# Patient Record
Sex: FEMALE | Race: BLACK OR AFRICAN AMERICAN | Employment: FULL TIME | URBAN - METROPOLITAN AREA
[De-identification: names, ages, dates, MRNs, and addresses within clinical notes are randomized per-mention and may not be internally consistent; named-entity substitution may affect disease eponyms.]

---

## 2023-11-22 ENCOUNTER — TELEPHONE (OUTPATIENT)
Dept: OBSTETRICS AND GYNECOLOGY | Facility: CLINIC | Age: 33
End: 2023-11-22
Payer: COMMERCIAL

## 2023-11-22 DIAGNOSIS — N76.0 BACTERIAL VAGINOSIS: ICD-10-CM

## 2023-11-22 DIAGNOSIS — B96.89 BACTERIAL VAGINOSIS: ICD-10-CM

## 2023-11-22 DIAGNOSIS — B37.31 YEAST VAGINITIS: Primary | ICD-10-CM

## 2023-11-22 RX ORDER — FLUCONAZOLE 150 MG/1
150 TABLET ORAL ONCE
Qty: 1 TABLET | Refills: 0 | Status: SHIPPED | OUTPATIENT
Start: 2023-11-22 | End: 2023-11-22

## 2023-11-22 RX ORDER — METRONIDAZOLE 7.5 MG/G
GEL VAGINAL DAILY
Qty: 70 G | Refills: 0 | Status: SHIPPED | OUTPATIENT
Start: 2023-11-22 | End: 2023-11-27

## 2023-12-07 ENCOUNTER — OFFICE VISIT (OUTPATIENT)
Dept: OBSTETRICS AND GYNECOLOGY | Facility: CLINIC | Age: 33
End: 2023-12-07
Payer: COMMERCIAL

## 2023-12-07 VITALS
SYSTOLIC BLOOD PRESSURE: 140 MMHG | WEIGHT: 188 LBS | DIASTOLIC BLOOD PRESSURE: 76 MMHG | BODY MASS INDEX: 31.32 KG/M2 | HEIGHT: 65 IN

## 2023-12-07 DIAGNOSIS — N92.1 MENORRHAGIA WITH IRREGULAR CYCLE: ICD-10-CM

## 2023-12-07 DIAGNOSIS — Z01.419 WELL WOMAN EXAM WITH ROUTINE GYNECOLOGICAL EXAM: Primary | ICD-10-CM

## 2023-12-07 LAB
ERYTHROCYTE [DISTWIDTH] IN BLOOD BY AUTOMATED COUNT: 16.8 % (ref 11.5–14.5)
HCT VFR BLD AUTO: 30.9 % (ref 36–46)
HGB BLD-MCNC: 9.2 G/DL (ref 12–16)
MCH RBC QN AUTO: 22.9 PG (ref 26–34)
MCHC RBC AUTO-ENTMCNC: 29.8 G/DL (ref 32–36)
MCV RBC AUTO: 77 FL (ref 80–100)
NRBC BLD-RTO: 0 /100 WBCS (ref 0–0)
PLATELET # BLD AUTO: 404 X10*3/UL (ref 150–450)
RBC # BLD AUTO: 4.01 X10*6/UL (ref 4–5.2)
WBC # BLD AUTO: 5.7 X10*3/UL (ref 4.4–11.3)

## 2023-12-07 PROCEDURE — 36415 COLL VENOUS BLD VENIPUNCTURE: CPT

## 2023-12-07 PROCEDURE — 84443 ASSAY THYROID STIM HORMONE: CPT

## 2023-12-07 PROCEDURE — 1036F TOBACCO NON-USER: CPT | Performed by: OBSTETRICS & GYNECOLOGY

## 2023-12-07 PROCEDURE — 87624 HPV HI-RISK TYP POOLED RSLT: CPT

## 2023-12-07 PROCEDURE — 99395 PREV VISIT EST AGE 18-39: CPT | Performed by: OBSTETRICS & GYNECOLOGY

## 2023-12-07 PROCEDURE — 88175 CYTOPATH C/V AUTO FLUID REDO: CPT

## 2023-12-07 PROCEDURE — 85027 COMPLETE CBC AUTOMATED: CPT

## 2023-12-07 NOTE — PROGRESS NOTES
"Navya Echeverria is a 33 y.o. female who is here for a routine exam.     Periods are  irregular.  Over the last few months since August her cycles are a little more sporadic.  Flow will last anywhere from 11 to 7 days , l    Sexually active: Status post bilateral salpingectomy  Active no concerns    Regular self breast exam: yes  No concerns on her exams    Menstrual History:  OB History          4    Para   4    Term                AB        Living   4         SAB        IAB        Ectopic        Multiple        Live Births                    Patient's last menstrual period was 2023.         Review of Systems  All Normal Review of Systems  Constitutional: no fever, no chills, no recent weight gain, no recent weight loss and no fatigue.    Gastrointestinal: no abdominal pain, no constipation, no nausea, no diarrhea and no vomiting.    Genitourinary: no dysuria, no urinary incontinence, no vaginal dryness, no vaginal itching, no dyspareunia, no pelvic pain, no dysmenorrhea, no sexual problems, no change in urinary frequency, no vaginal discharge, no unexplained vaginal bleeding and no lesion/sore.    Breasts: No masses.  No nipple discharge.  No redness    Objective   /76   Ht 1.651 m (5' 5\")   Wt 85.3 kg (188 lb)   LMP 2023 Comment: Went for 11 days  BMI 31.28 kg/m²     OBGyn Exam   Physical Exam  Constitutional: Alert and in no acute distress. Well developed, well nourished.   Head and Face: Head and face: Normal.    Eyes: Normal external exam - nonicteric sclera, extraocular movements intact (EOMI) and no ptosis.   Ears, Nose, Mouth, and Throat: External inspection of ears and nose: Normal.    Neck: No neck asymmetry. Supple. Thyroid not enlarged and there were no palpable thyroid nodules.   Chest: Breasts: Normal appearance, no nipple discharge and no skin changes. Palpation of breasts and axillae: No palpable mass and no axillary lymphadenopathy.   Abdomen: Soft nontender; no " abdominal mass palpated. No organomegaly. No hernias.     Genitourinary:   External genitalia: Normal. No inguinal lymphadenopathy. Bartholin's Urethral and Skenes Glands: Normal. Urethra: Normal.    Bladder: Normal on palpation.   Vagina: Normal. No discharge  Cervix: Normal.    Uterus: Normal.    Right Adnexa/parametria: Normal.  Left Adnexa/parametria: Normal.    Inspection of Perianal Area: Normal.     Musculoskeletal: No joint swelling seen, normal movements of all extremities.   Skin: Normal skin color and pigmentation, normal skin turgor, and no rash.   Neurologic: Non-focal. Grossly intact.   Psychiatric: Alert and oriented x 3. Affect normal to patient baseline. Mood: Appropriate.      Assessment/Plan   1) Annual exam:  Pap with HPV testing completed today    2) menorrhagia  CBC and TSH today  Ultrasound ordered  Will follow-up thereafter    Thank you again for your visit to our office today  Please follow-up as needed or in 1 year with your annual exam

## 2023-12-08 LAB — TSH SERPL-ACNC: 1.21 MIU/L (ref 0.44–3.98)

## 2024-01-04 ENCOUNTER — APPOINTMENT (OUTPATIENT)
Dept: RADIOLOGY | Facility: HOSPITAL | Age: 34
End: 2024-01-04
Payer: COMMERCIAL

## 2024-01-19 ENCOUNTER — HOSPITAL ENCOUNTER (OUTPATIENT)
Dept: RADIOLOGY | Facility: HOSPITAL | Age: 34
Discharge: HOME | End: 2024-01-19
Payer: COMMERCIAL

## 2024-01-19 DIAGNOSIS — N92.1 MENORRHAGIA WITH IRREGULAR CYCLE: ICD-10-CM

## 2024-01-19 PROCEDURE — 76830 TRANSVAGINAL US NON-OB: CPT | Performed by: RADIOLOGY

## 2024-01-19 PROCEDURE — 76856 US EXAM PELVIC COMPLETE: CPT

## 2024-01-19 PROCEDURE — 76856 US EXAM PELVIC COMPLETE: CPT | Performed by: RADIOLOGY

## 2024-02-01 ENCOUNTER — OFFICE VISIT (OUTPATIENT)
Dept: OBSTETRICS AND GYNECOLOGY | Facility: CLINIC | Age: 34
End: 2024-02-01
Payer: COMMERCIAL

## 2024-02-01 VITALS
BODY MASS INDEX: 29.32 KG/M2 | SYSTOLIC BLOOD PRESSURE: 132 MMHG | WEIGHT: 176 LBS | HEIGHT: 65 IN | DIASTOLIC BLOOD PRESSURE: 78 MMHG

## 2024-02-01 DIAGNOSIS — N92.1 MENORRHAGIA WITH IRREGULAR CYCLE: Primary | ICD-10-CM

## 2024-02-01 PROCEDURE — 99213 OFFICE O/P EST LOW 20 MIN: CPT | Performed by: OBSTETRICS & GYNECOLOGY

## 2024-02-01 PROCEDURE — 1036F TOBACCO NON-USER: CPT | Performed by: OBSTETRICS & GYNECOLOGY

## 2024-02-01 NOTE — PROGRESS NOTES
"Subjective   Patient ID: Navya Echeverria is a 33 y.o. female who presents for Follow-up (Ultrasound Results).    Patient here for follow-up for her menorrhagia  Pelvic ultrasound within normal limits  Status post bilateral salpingectomy and not interested in having more children.  Is a G4, P4.    Patient is not interested in medical options.  Is considering an ablation or hysterectomy.  Discussed with patient we will need to evaluate the lining of the uterus.  Offered office versus D&C.  She will we will schedule as an office hysteroscopy with biopsy    Is considering an ablation versus a D&C.  Both reviewed in detail    ROS  All Normal Review of Systems  Constitutional: no fever, no chills, no recent weight gain, no recent weight loss and no fatigue.    Gastrointestinal: no abdominal pain, no constipation, no nausea, no diarrhea and no vomiting.    Genitourinary: no dysuria, no urinary incontinence, no vaginal dryness, no vaginal itching, no dyspareunia, no pelvic pain, no dysmenorrhea, no sexual problems, no change in urinary frequency, no vaginal discharge, no unexplained vaginal bleeding and no lesion/sore.    Breasts: No masses.  No nipple discharge.  No redness    Objective   /78   Ht 1.651 m (5' 5\")   Wt 79.8 kg (176 lb)   LMP 01/30/2024 Comment: Spotting  BMI 29.29 kg/m²    Physical Exam  General: No distress.  Alert and oriented  Abdomen: Soft, nontender, nondistended  External Genitalia:  no masses.  no erythema.  no discharge noted.  Vagina:  no masses.  no discharge noted.    Cervix: no masses.    Uterus:  normal size and contour.  no masses. palpated  Adnexa: R: no masses, non tender.    Adnexa: L: no masses.  non tender  Extremities: No swelling  Psych: No sadness.      Assessment/Plan   1) menorrhagia  Normal ultrasound - reviewed today with patient  Medical and surgical options reviewed  Patient with bleeding now up to 20 days.  Will need to evaluate the lining of the uterus.  Offered " office hysteroscopy versus D&C in the operating room.  Will try here in the office.  Will schedule in 10 to 15 days when she is just finished with her cycle.  Please take Tylenol or Motrin with food prior to the procedure to help you tolerate the cramping  We discussed surgical treatments which include an ablation versus a hysterectomy.  Both are outpatient procedures and neither would affect your hormones or your ovaries.  The ablation would certainly minimize your bleeding the hysterectomy would be more long-term and definitive.  Please consider your options and follow-up    Thank you again for your visit to our office today

## 2024-02-12 ENCOUNTER — PROCEDURE VISIT (OUTPATIENT)
Dept: OBSTETRICS AND GYNECOLOGY | Facility: CLINIC | Age: 34
End: 2024-02-12
Payer: COMMERCIAL

## 2024-02-12 VITALS
WEIGHT: 189 LBS | BODY MASS INDEX: 31.49 KG/M2 | HEIGHT: 65 IN | SYSTOLIC BLOOD PRESSURE: 138 MMHG | DIASTOLIC BLOOD PRESSURE: 70 MMHG

## 2024-02-12 DIAGNOSIS — N92.1 MENORRHAGIA WITH IRREGULAR CYCLE: Primary | ICD-10-CM

## 2024-02-12 DIAGNOSIS — Z32.02 ENCOUNTER FOR PREGNANCY TEST WITH RESULT NEGATIVE: ICD-10-CM

## 2024-02-12 LAB — PREGNANCY TEST URINE, POC: NEGATIVE

## 2024-02-12 PROCEDURE — 88305 TISSUE EXAM BY PATHOLOGIST: CPT

## 2024-02-12 PROCEDURE — 58558 HYSTEROSCOPY BIOPSY: CPT | Performed by: OBSTETRICS & GYNECOLOGY

## 2024-02-12 PROCEDURE — 81025 URINE PREGNANCY TEST: CPT | Performed by: OBSTETRICS & GYNECOLOGY

## 2024-02-12 PROCEDURE — 88305 TISSUE EXAM BY PATHOLOGIST: CPT | Performed by: PATHOLOGY

## 2024-02-12 NOTE — PROGRESS NOTES
Patient with menorrhagia  Is here for EMB hysteroscopy  Questions answered and consent signed  Negative pregnancy test today    Hysteroscopy:    Speculum placed  Hibiclens prep of cervix  Uterus sounded to 8 cm and then hysteroscope inserted without any complications.  Saline was uses medium  Normal ostia seen bilaterally.  Normal cavity  Hysteroscope used to remove saline via suction  Hysteroscope removed  Patient tolerated procedure well  No complications    Endometrial biopsy completed      Patient ID: Navya Echeverria is a 33 y.o. female.    Endometrial biopsy    Date/Time: 2/12/2024 9:21 AM    Performed by: Kody Pepper MD  Authorized by: Kody Pepper MD    Consent:     Consent obtained: written    Consent given by: patient  Indications:     Indications: abnormal uterine bleeding    Pre-procedure:     Urine pregnancy test: negative    Procedure:     Prepped with comment: hibiclens    Tenaculum used: no      A local block was performed: no      Cervix dilated: no      Number of passes: 1  Findings:     Cervix: normal      Uterus depth by sound (cm): 8    Specimen collected: specimen collected and sent to pathology      Patient tolerance: tolerated well, no immediate complications          Thank you for your visit.  I am glad you tolerated both procedures well.  The lining of the uterus appeared normal without any irregularity.  Your endometrial biopsy results will return in 1 to 2 weeks.  Please schedule a follow-up so we can discuss your options again moving forward and make our plan    Thank you again for your visit to our office today

## 2024-02-19 LAB
LABORATORY COMMENT REPORT: NORMAL
PATH REPORT.FINAL DX SPEC: NORMAL
PATH REPORT.GROSS SPEC: NORMAL
PATH REPORT.RELEVANT HX SPEC: NORMAL
PATH REPORT.TOTAL CANCER: NORMAL

## 2024-03-04 ENCOUNTER — OFFICE VISIT (OUTPATIENT)
Dept: OBSTETRICS AND GYNECOLOGY | Facility: CLINIC | Age: 34
End: 2024-03-04
Payer: COMMERCIAL

## 2024-03-04 ENCOUNTER — PREP FOR PROCEDURE (OUTPATIENT)
Dept: OBSTETRICS AND GYNECOLOGY | Facility: CLINIC | Age: 34
End: 2024-03-04

## 2024-03-04 VITALS
BODY MASS INDEX: 30.99 KG/M2 | SYSTOLIC BLOOD PRESSURE: 136 MMHG | WEIGHT: 186 LBS | HEIGHT: 65 IN | DIASTOLIC BLOOD PRESSURE: 66 MMHG

## 2024-03-04 DIAGNOSIS — N92.1 MENORRHAGIA WITH IRREGULAR CYCLE: Primary | ICD-10-CM

## 2024-03-04 DIAGNOSIS — N92.0 MENORRHAGIA WITH REGULAR CYCLE: Primary | ICD-10-CM

## 2024-03-04 PROCEDURE — 99213 OFFICE O/P EST LOW 20 MIN: CPT | Performed by: OBSTETRICS & GYNECOLOGY

## 2024-03-04 PROCEDURE — 1036F TOBACCO NON-USER: CPT | Performed by: OBSTETRICS & GYNECOLOGY

## 2024-03-04 RX ORDER — SODIUM CHLORIDE, SODIUM LACTATE, POTASSIUM CHLORIDE, CALCIUM CHLORIDE 600; 310; 30; 20 MG/100ML; MG/100ML; MG/100ML; MG/100ML
100 INJECTION, SOLUTION INTRAVENOUS CONTINUOUS
Status: CANCELLED | OUTPATIENT
Start: 2024-03-04

## 2024-03-04 RX ORDER — ETHYNODIOL DIACETATE AND ETHINYL ESTRADIOL 1 MG-35MCG
1 KIT ORAL DAILY
Qty: 84 TABLET | Refills: 0 | Status: SHIPPED | OUTPATIENT
Start: 2024-03-04 | End: 2024-05-28

## 2024-03-04 NOTE — PROGRESS NOTES
"Subjective   Patient ID: Navya Echeverria is a 33 y.o. female who presents for Follow-up (EMB results).    Patient with menorrhagia  Ultrasound shows normal size uterus  Endometrial biopsy within normal limits     with 4 vaginal births.  Status post tubal ligation    Options discussed with patient moving forward: Medical options with the pill and the ring or surgical options with ablation versus hysterectomy    Patient would like a hysterectomy  Will schedule for     Will start on the pill in the interim to manage her bleeding.  Side effects and risks reviewed  Non-smoker with normal blood pressure  Will follow-up in May for preop visit    ROS  All Normal Review of Systems  Constitutional: no fever, no chills, no recent weight gain, no recent weight loss and no fatigue.    Gastrointestinal: no abdominal pain, no constipation, no nausea, no diarrhea and no vomiting.    Genitourinary: no dysuria, no urinary incontinence, no vaginal dryness, no vaginal itching, no dyspareunia, no pelvic pain, no dysmenorrhea, no sexual problems, no change in urinary frequency, no vaginal discharge, no unexplained vaginal bleeding and no lesion/sore.    Breasts: No masses.  No nipple discharge.  No redness    Objective   /66   Ht 1.651 m (5' 5\")   Wt 84.4 kg (186 lb)   LMP 2024   BMI 30.95 kg/m²    Physical Exam  General: No distress.  Alert and oriented  Abdomen: Soft, nontender, nondistended  External Genitalia:  no masses.  no erythema.  no discharge noted.  Vagina:  no masses.  no discharge noted.    Cervix: no masses.    Uterus:  normal size and contour.  no masses. palpated  Adnexa: R: no masses, non tender.    Adnexa: L: no masses.  non tender  Extremities: No swelling  Psych: No sadness.    Ultrasound:  FINDINGS:  UTERUS:  The uterus measures 7.9 x 4.9 x 5.7 cm. The uterine myometrium  appears normal.      ENDOMETRIUM:  Normal thickness; 11 mm.      RIGHT ADNEXA:  The right ovary measures 2.1 x 1.1 x " 1.1 cm and demonstrates normal  flow. No gross right adnexal masses are seen, no hydrosalpinx.      LEFT ADNEXA:  The left ovary measures 3.7 x 2.7 x 2 cm and demonstrates normal  flow. A 2.4 cm simple cyst is noted the left ovary. No suspicious  left adnexal masses are seen, no hydrosalpinx.      CUL DE SAC:  No gross free fluid is seen in the pelvic cul-de-sac.      IMPRESSION:    Path:  A. ENDOMETRIUM BIOPSY:   -- PROLIFERATIVE PHASE ENDOMETRIUM.     Assessment/Plan   1) menorrhagia  Endometrial biopsy within normal limits  Ultrasound within normal limits    Options discussed with patient including medical and surgical options.  She would like to move forward with a hysterectomy  Will schedule for June 5 as she is like to wait until her children are done with school  Will also manage her bleeding in the interim with medical management.  Options including the pill and ring reviewed  Side effects and risks reviewed  Pharmacy for ring.  Will take 1 pill the same time every day for maintenance of 4 cycle      Thank you for your visit to our office today  Please follow-up in the middle of May for your preop visit

## 2024-05-21 ENCOUNTER — PREP FOR PROCEDURE (OUTPATIENT)
Dept: OBSTETRICS AND GYNECOLOGY | Facility: CLINIC | Age: 34
End: 2024-05-21

## 2024-05-21 ENCOUNTER — OFFICE VISIT (OUTPATIENT)
Dept: OBSTETRICS AND GYNECOLOGY | Facility: CLINIC | Age: 34
End: 2024-05-21
Payer: COMMERCIAL

## 2024-05-21 VITALS
BODY MASS INDEX: 31.41 KG/M2 | WEIGHT: 184 LBS | HEIGHT: 64 IN | SYSTOLIC BLOOD PRESSURE: 132 MMHG | DIASTOLIC BLOOD PRESSURE: 78 MMHG

## 2024-05-21 DIAGNOSIS — Z01.818 ENCOUNTER FOR PREOPERATIVE ASSESSMENT: ICD-10-CM

## 2024-05-21 DIAGNOSIS — G89.18 POST-OP PAIN: ICD-10-CM

## 2024-05-21 DIAGNOSIS — N92.1 MENORRHAGIA WITH IRREGULAR CYCLE: Primary | ICD-10-CM

## 2024-05-21 LAB
ABO GROUP (TYPE) IN BLOOD: NORMAL
ANTIBODY SCREEN: NORMAL
ERYTHROCYTE [DISTWIDTH] IN BLOOD BY AUTOMATED COUNT: 17.8 % (ref 11.5–14.5)
HCT VFR BLD AUTO: 29.8 % (ref 36–46)
HGB BLD-MCNC: 8.8 G/DL (ref 12–16)
MCH RBC QN AUTO: 21.9 PG (ref 26–34)
MCHC RBC AUTO-ENTMCNC: 29.5 G/DL (ref 32–36)
MCV RBC AUTO: 74 FL (ref 80–100)
NRBC BLD-RTO: 0 /100 WBCS (ref 0–0)
PLATELET # BLD AUTO: 377 X10*3/UL (ref 150–450)
RBC # BLD AUTO: 4.01 X10*6/UL (ref 4–5.2)
RH FACTOR (ANTIGEN D): NORMAL
WBC # BLD AUTO: 4.5 X10*3/UL (ref 4.4–11.3)

## 2024-05-21 PROCEDURE — 86850 RBC ANTIBODY SCREEN: CPT

## 2024-05-21 PROCEDURE — 1036F TOBACCO NON-USER: CPT | Performed by: OBSTETRICS & GYNECOLOGY

## 2024-05-21 PROCEDURE — 85027 COMPLETE CBC AUTOMATED: CPT

## 2024-05-21 PROCEDURE — 99214 OFFICE O/P EST MOD 30 MIN: CPT | Performed by: OBSTETRICS & GYNECOLOGY

## 2024-05-21 PROCEDURE — 86900 BLOOD TYPING SEROLOGIC ABO: CPT

## 2024-05-21 PROCEDURE — 36415 COLL VENOUS BLD VENIPUNCTURE: CPT

## 2024-05-21 PROCEDURE — 86901 BLOOD TYPING SEROLOGIC RH(D): CPT

## 2024-05-21 RX ORDER — DOCUSATE SODIUM 100 MG/1
200 CAPSULE, LIQUID FILLED ORAL 2 TIMES DAILY
Qty: 30 CAPSULE | Refills: 0 | Status: SHIPPED | OUTPATIENT
Start: 2024-05-21

## 2024-05-21 RX ORDER — OXYCODONE AND ACETAMINOPHEN 5; 325 MG/1; MG/1
1 TABLET ORAL EVERY 6 HOURS PRN
Qty: 20 TABLET | Refills: 0 | Status: SHIPPED | OUTPATIENT
Start: 2024-05-21

## 2024-05-21 NOTE — H&P (VIEW-ONLY)
History Of Present Illness  Navya Echeverria is a 34 y.o. female presenting for Preop visit.  Patient is scheduled for robotic assisted total laparoscopic hysterectomy cystoscopy on .  This procedure will remove your uterus and cervix.   Will look in the bladder afterwards    N.p.o. after midnight night prior to surgery  No NSAIDs for a week prior to surgery  Outpatient surgery reviewed with patient  Risks and alternatives reviewed with patient: Infection hemorrhage injury to internal organs anesthesia and death  Questions answered and consent signed.  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    Surgical History  She has a past surgical history that includes Bilateral salpingoophorectomy; Mouth surgery; XR ankle; Tonsillectomy; and Adenoidectomy.     OB History  OB History    Para Term  AB Living   4 4       4   SAB IAB Ectopic Multiple Live Births                  # Outcome Date GA Lbr Nish/2nd Weight Sex Delivery Anes PTL Lv   4 Para      Vag-Spont      3 Para      Vag-Spont      2 Para      Vag-Spont      1 Para      Vag-Spont          Social History  She reports that she has never smoked. She has never used smokeless tobacco. No history on file for alcohol use and drug use.    Family History  No family history on file.     Allergies  Shellfish derived, Aspirin, and Penicillin    Review of Systems     Last Recorded Vitals  There were no vitals taken for this visit.         Relevant Results  Medications    Current Outpatient Medications:     ethynodiol diac-eth estradioL (Kelnor, Zovia) 1-35 mg-mcg tablet, Take 1 tablet by mouth once daily., Disp: 84 tablet, Rfl: 0    Imaging    UTERUS:  The uterus measures 7.9 x 4.9 x 5.7 cm. The uterine myometrium  appears normal.       ENDOMETRIUM:  Normal thickness; 11 mm.      RIGHT ADNEXA:  The right ovary measures 2.1 x 1.1 x 1.1 cm and demonstrates normal  flow. No gross right adnexal masses are seen, no hydrosalpinx.      LEFT ADNEXA:  The left ovary measures 3.7 x 2.7 x 2 cm and demonstrates normal  flow. A 2.4 cm simple cyst is noted the left ovary. No suspicious  left adnexal masses are seen, no hydrosalpinx.      CUL DE SAC:  No gross free fluid is seen in the pelvic cul-de-sac.      IMPRESSION:  Unremarkable pelvic ultrasound.     Assessment and Plan:  1) Preop visit    Thank you for your visit to our office today  Today we discussed her surgery which is scheduled on June 5.  You are scheduled for a robotic assisted total laparoscopic hysterectomy with cystoscopy.  Your preop testing completed today  Do not take any NSAIDs for a week prior to surgery  Bowel prep day prior to surgery  Nothing to eat or drink after midnight the night prior to surgery  This will be outpatient surgery at [facility]. You will need a responsible adult to bring into the hospital, take you home, spend the night of surgery at home with you  We discussed risk of surgery which include infection hemorrhage injury to internal organs laparotomy anesthesia and death  I encourage pelvic rest and 20 pound restriction for 8 weeks after surgery  Please follow-up 2 weeks after surgery  Postoperative medications: ordered via meds to beds    Thank you again for your visit to our office today     I spent 35 minutes in the professional and overall care of this patient.      Kody Pepepr MD

## 2024-05-21 NOTE — PROGRESS NOTES
History Of Present Illness  Navya Echeverria is a 34 y.o. female presenting for Preop visit.  Patient is scheduled for robotic assisted total laparoscopic hysterectomy cystoscopy on .  This procedure will remove your uterus and cervix.   Will look in the bladder afterwards    N.p.o. after midnight night prior to surgery  No NSAIDs for a week prior to surgery  Outpatient surgery reviewed with patient  Risks and alternatives reviewed with patient: Infection hemorrhage injury to internal organs anesthesia and death  Questions answered and consent signed.  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    Surgical History  She has a past surgical history that includes Bilateral salpingoophorectomy; Mouth surgery; XR ankle; Tonsillectomy; and Adenoidectomy.     OB History  OB History    Para Term  AB Living   4 4       4   SAB IAB Ectopic Multiple Live Births                  # Outcome Date GA Lbr Nish/2nd Weight Sex Delivery Anes PTL Lv   4 Para      Vag-Spont      3 Para      Vag-Spont      2 Para      Vag-Spont      1 Para      Vag-Spont          Social History  She reports that she has never smoked. She has never used smokeless tobacco. No history on file for alcohol use and drug use.    Family History  No family history on file.     Allergies  Shellfish derived, Aspirin, and Penicillin    Review of Systems     Last Recorded Vitals  There were no vitals taken for this visit.         Relevant Results  Medications    Current Outpatient Medications:     ethynodiol diac-eth estradioL (Kelnor, Zovia) 1-35 mg-mcg tablet, Take 1 tablet by mouth once daily., Disp: 84 tablet, Rfl: 0    Imaging    UTERUS:  The uterus measures 7.9 x 4.9 x 5.7 cm. The uterine myometrium  appears normal.       ENDOMETRIUM:  Normal thickness; 11 mm.      RIGHT ADNEXA:  The right ovary measures 2.1 x 1.1 x 1.1 cm and demonstrates normal  flow. No gross right adnexal masses are seen, no hydrosalpinx.      LEFT ADNEXA:  The left ovary measures 3.7 x 2.7 x 2 cm and demonstrates normal  flow. A 2.4 cm simple cyst is noted the left ovary. No suspicious  left adnexal masses are seen, no hydrosalpinx.      CUL DE SAC:  No gross free fluid is seen in the pelvic cul-de-sac.      IMPRESSION:  Unremarkable pelvic ultrasound.     Assessment and Plan:  1) Preop visit    Thank you for your visit to our office today  Today we discussed her surgery which is scheduled on June 5.  You are scheduled for a robotic assisted total laparoscopic hysterectomy with cystoscopy.  Your preop testing completed today  Do not take any NSAIDs for a week prior to surgery  Bowel prep day prior to surgery  Nothing to eat or drink after midnight the night prior to surgery  This will be outpatient surgery at [facility]. You will need a responsible adult to bring into the hospital, take you home, spend the night of surgery at home with you  We discussed risk of surgery which include infection hemorrhage injury to internal organs laparotomy anesthesia and death  I encourage pelvic rest and 20 pound restriction for 8 weeks after surgery  Please follow-up 2 weeks after surgery  Postoperative medications: ordered via meds to beds    Thank you again for your visit to our office today     I spent 35 minutes in the professional and overall care of this patient.      Kody Pepper MD

## 2024-05-25 DIAGNOSIS — N92.1 MENORRHAGIA WITH IRREGULAR CYCLE: ICD-10-CM

## 2024-05-28 RX ORDER — ETHYNODIOL DIACETATE AND ETHINYL ESTRADIOL 1 MG-35MCG
1 KIT ORAL DAILY
Qty: 84 TABLET | Refills: 0 | Status: SHIPPED | OUTPATIENT
Start: 2024-05-28 | End: 2024-06-05 | Stop reason: HOSPADM

## 2024-06-04 ENCOUNTER — ANESTHESIA EVENT (OUTPATIENT)
Dept: OPERATING ROOM | Facility: HOSPITAL | Age: 34
End: 2024-06-04
Payer: COMMERCIAL

## 2024-06-04 PROCEDURE — RXMED WILLOW AMBULATORY MEDICATION CHARGE

## 2024-06-05 ENCOUNTER — HOSPITAL ENCOUNTER (OUTPATIENT)
Facility: HOSPITAL | Age: 34
Setting detail: OUTPATIENT SURGERY
Discharge: HOME | End: 2024-06-05
Attending: OBSTETRICS & GYNECOLOGY | Admitting: OBSTETRICS & GYNECOLOGY
Payer: COMMERCIAL

## 2024-06-05 ENCOUNTER — PHARMACY VISIT (OUTPATIENT)
Dept: PHARMACY | Facility: CLINIC | Age: 34
End: 2024-06-05
Payer: COMMERCIAL

## 2024-06-05 ENCOUNTER — ANESTHESIA (OUTPATIENT)
Dept: OPERATING ROOM | Facility: HOSPITAL | Age: 34
End: 2024-06-05
Payer: COMMERCIAL

## 2024-06-05 VITALS
OXYGEN SATURATION: 99 % | HEART RATE: 81 BPM | RESPIRATION RATE: 16 BRPM | DIASTOLIC BLOOD PRESSURE: 85 MMHG | TEMPERATURE: 97 F | SYSTOLIC BLOOD PRESSURE: 125 MMHG

## 2024-06-05 DIAGNOSIS — N92.0 MENORRHAGIA WITH REGULAR CYCLE: ICD-10-CM

## 2024-06-05 LAB
ABO GROUP (TYPE) IN BLOOD: NORMAL
ANTIBODY SCREEN: NORMAL
PREGNANCY TEST URINE, POC: NEGATIVE
RH FACTOR (ANTIGEN D): NORMAL

## 2024-06-05 PROCEDURE — 3600000017 HC OR TIME - EACH INCREMENTAL 1 MINUTE - PROCEDURE LEVEL SIX: Performed by: OBSTETRICS & GYNECOLOGY

## 2024-06-05 PROCEDURE — 3600000018 HC OR TIME - INITIAL BASE CHARGE - PROCEDURE LEVEL SIX: Performed by: OBSTETRICS & GYNECOLOGY

## 2024-06-05 PROCEDURE — 7100000010 HC PHASE TWO TIME - EACH INCREMENTAL 1 MINUTE: Performed by: OBSTETRICS & GYNECOLOGY

## 2024-06-05 PROCEDURE — 2780000003 HC OR 278 NO HCPCS: Performed by: OBSTETRICS & GYNECOLOGY

## 2024-06-05 PROCEDURE — 2500000005 HC RX 250 GENERAL PHARMACY W/O HCPCS: Performed by: NURSE ANESTHETIST, CERTIFIED REGISTERED

## 2024-06-05 PROCEDURE — 2500000004 HC RX 250 GENERAL PHARMACY W/ HCPCS (ALT 636 FOR OP/ED): Performed by: ANESTHESIOLOGY

## 2024-06-05 PROCEDURE — 2500000005 HC RX 250 GENERAL PHARMACY W/O HCPCS: Performed by: ANESTHESIOLOGY

## 2024-06-05 PROCEDURE — 2720000007 HC OR 272 NO HCPCS: Performed by: OBSTETRICS & GYNECOLOGY

## 2024-06-05 PROCEDURE — 7100000002 HC RECOVERY ROOM TIME - EACH INCREMENTAL 1 MINUTE: Performed by: OBSTETRICS & GYNECOLOGY

## 2024-06-05 PROCEDURE — 58570 TLH UTERUS 250 G OR LESS: CPT | Performed by: OBSTETRICS & GYNECOLOGY

## 2024-06-05 PROCEDURE — 3700000001 HC GENERAL ANESTHESIA TIME - INITIAL BASE CHARGE: Performed by: OBSTETRICS & GYNECOLOGY

## 2024-06-05 PROCEDURE — 3700000002 HC GENERAL ANESTHESIA TIME - EACH INCREMENTAL 1 MINUTE: Performed by: OBSTETRICS & GYNECOLOGY

## 2024-06-05 PROCEDURE — 7100000009 HC PHASE TWO TIME - INITIAL BASE CHARGE: Performed by: OBSTETRICS & GYNECOLOGY

## 2024-06-05 PROCEDURE — 2500000004 HC RX 250 GENERAL PHARMACY W/ HCPCS (ALT 636 FOR OP/ED): Performed by: NURSE ANESTHETIST, CERTIFIED REGISTERED

## 2024-06-05 PROCEDURE — 7100000001 HC RECOVERY ROOM TIME - INITIAL BASE CHARGE: Performed by: OBSTETRICS & GYNECOLOGY

## 2024-06-05 PROCEDURE — 2500000004 HC RX 250 GENERAL PHARMACY W/ HCPCS (ALT 636 FOR OP/ED): Performed by: OBSTETRICS & GYNECOLOGY

## 2024-06-05 PROCEDURE — 86900 BLOOD TYPING SEROLOGIC ABO: CPT | Performed by: OBSTETRICS & GYNECOLOGY

## 2024-06-05 PROCEDURE — 2500000001 HC RX 250 WO HCPCS SELF ADMINISTERED DRUGS (ALT 637 FOR MEDICARE OP): Performed by: ANESTHESIOLOGY

## 2024-06-05 PROCEDURE — 2500000005 HC RX 250 GENERAL PHARMACY W/O HCPCS: Performed by: OBSTETRICS & GYNECOLOGY

## 2024-06-05 PROCEDURE — 36415 COLL VENOUS BLD VENIPUNCTURE: CPT | Performed by: OBSTETRICS & GYNECOLOGY

## 2024-06-05 PROCEDURE — S2900 ROBOTIC SURGICAL SYSTEM: HCPCS | Performed by: OBSTETRICS & GYNECOLOGY

## 2024-06-05 PROCEDURE — 88307 TISSUE EXAM BY PATHOLOGIST: CPT | Mod: TC,AHULAB | Performed by: OBSTETRICS & GYNECOLOGY

## 2024-06-05 RX ORDER — MIDAZOLAM HYDROCHLORIDE 1 MG/ML
INJECTION INTRAMUSCULAR; INTRAVENOUS AS NEEDED
Status: DISCONTINUED | OUTPATIENT
Start: 2024-06-05 | End: 2024-06-05

## 2024-06-05 RX ORDER — SODIUM CHLORIDE, SODIUM LACTATE, POTASSIUM CHLORIDE, CALCIUM CHLORIDE 600; 310; 30; 20 MG/100ML; MG/100ML; MG/100ML; MG/100ML
100 INJECTION, SOLUTION INTRAVENOUS CONTINUOUS
Status: DISCONTINUED | OUTPATIENT
Start: 2024-06-05 | End: 2024-06-05 | Stop reason: HOSPADM

## 2024-06-05 RX ORDER — LIDOCAINE HYDROCHLORIDE 10 MG/ML
0.1 INJECTION, SOLUTION EPIDURAL; INFILTRATION; INTRACAUDAL; PERINEURAL ONCE
Status: DISCONTINUED | OUTPATIENT
Start: 2024-06-05 | End: 2024-06-05 | Stop reason: HOSPADM

## 2024-06-05 RX ORDER — OXYCODONE HYDROCHLORIDE 5 MG/1
5 TABLET ORAL ONCE
Status: DISCONTINUED | OUTPATIENT
Start: 2024-06-05 | End: 2024-06-05 | Stop reason: HOSPADM

## 2024-06-05 RX ORDER — FLUORESCEIN 500 MG/ML
INJECTION INTRAVENOUS AS NEEDED
Status: DISCONTINUED | OUTPATIENT
Start: 2024-06-05 | End: 2024-06-05

## 2024-06-05 RX ORDER — HYDRALAZINE HYDROCHLORIDE 20 MG/ML
10 INJECTION INTRAMUSCULAR; INTRAVENOUS ONCE
Status: COMPLETED | OUTPATIENT
Start: 2024-06-05 | End: 2024-06-05

## 2024-06-05 RX ORDER — PHENYLEPHRINE HCL IN 0.9% NACL 1 MG/10 ML
SYRINGE (ML) INTRAVENOUS AS NEEDED
Status: DISCONTINUED | OUTPATIENT
Start: 2024-06-05 | End: 2024-06-05

## 2024-06-05 RX ORDER — LABETALOL HYDROCHLORIDE 5 MG/ML
INJECTION, SOLUTION INTRAVENOUS AS NEEDED
Status: DISCONTINUED | OUTPATIENT
Start: 2024-06-05 | End: 2024-06-05

## 2024-06-05 RX ORDER — FENTANYL CITRATE 50 UG/ML
25 INJECTION, SOLUTION INTRAMUSCULAR; INTRAVENOUS EVERY 5 MIN PRN
Status: DISCONTINUED | OUTPATIENT
Start: 2024-06-05 | End: 2024-06-05 | Stop reason: HOSPADM

## 2024-06-05 RX ORDER — FENTANYL CITRATE 50 UG/ML
INJECTION, SOLUTION INTRAMUSCULAR; INTRAVENOUS AS NEEDED
Status: DISCONTINUED | OUTPATIENT
Start: 2024-06-05 | End: 2024-06-05

## 2024-06-05 RX ORDER — HYDROMORPHONE HYDROCHLORIDE 1 MG/ML
INJECTION, SOLUTION INTRAMUSCULAR; INTRAVENOUS; SUBCUTANEOUS AS NEEDED
Status: DISCONTINUED | OUTPATIENT
Start: 2024-06-05 | End: 2024-06-05

## 2024-06-05 RX ORDER — PROPOFOL 10 MG/ML
INJECTION, EMULSION INTRAVENOUS AS NEEDED
Status: DISCONTINUED | OUTPATIENT
Start: 2024-06-05 | End: 2024-06-05

## 2024-06-05 RX ORDER — OXYCODONE HYDROCHLORIDE 5 MG/1
5 TABLET ORAL EVERY 6 HOURS PRN
Status: COMPLETED | OUTPATIENT
Start: 2024-06-05 | End: 2024-06-05

## 2024-06-05 RX ORDER — DROPERIDOL 2.5 MG/ML
0.62 INJECTION, SOLUTION INTRAMUSCULAR; INTRAVENOUS ONCE AS NEEDED
Status: DISCONTINUED | OUTPATIENT
Start: 2024-06-05 | End: 2024-06-05 | Stop reason: HOSPADM

## 2024-06-05 RX ORDER — HYDRALAZINE HYDROCHLORIDE 20 MG/ML
10 INJECTION INTRAMUSCULAR; INTRAVENOUS ONCE
Status: DISCONTINUED | OUTPATIENT
Start: 2024-06-05 | End: 2024-06-05 | Stop reason: HOSPADM

## 2024-06-05 RX ORDER — CEFAZOLIN 1 G/1
INJECTION, POWDER, FOR SOLUTION INTRAVENOUS AS NEEDED
Status: DISCONTINUED | OUTPATIENT
Start: 2024-06-05 | End: 2024-06-05

## 2024-06-05 RX ORDER — HYDRALAZINE HYDROCHLORIDE 20 MG/ML
10 INJECTION INTRAMUSCULAR; INTRAVENOUS ONCE
Status: DISCONTINUED | OUTPATIENT
Start: 2024-06-05 | End: 2024-06-05

## 2024-06-05 RX ORDER — FENTANYL CITRATE 50 UG/ML
50 INJECTION, SOLUTION INTRAMUSCULAR; INTRAVENOUS EVERY 5 MIN PRN
Status: DISCONTINUED | OUTPATIENT
Start: 2024-06-05 | End: 2024-06-05 | Stop reason: HOSPADM

## 2024-06-05 RX ORDER — ONDANSETRON HYDROCHLORIDE 2 MG/ML
INJECTION, SOLUTION INTRAVENOUS AS NEEDED
Status: DISCONTINUED | OUTPATIENT
Start: 2024-06-05 | End: 2024-06-05

## 2024-06-05 RX ORDER — LIDOCAINE HYDROCHLORIDE 20 MG/ML
INJECTION, SOLUTION INFILTRATION; PERINEURAL AS NEEDED
Status: DISCONTINUED | OUTPATIENT
Start: 2024-06-05 | End: 2024-06-05

## 2024-06-05 RX ORDER — BUPIVACAINE HYDROCHLORIDE 5 MG/ML
INJECTION, SOLUTION PERINEURAL AS NEEDED
Status: DISCONTINUED | OUTPATIENT
Start: 2024-06-05 | End: 2024-06-05 | Stop reason: HOSPADM

## 2024-06-05 RX ORDER — ONDANSETRON HYDROCHLORIDE 2 MG/ML
4 INJECTION, SOLUTION INTRAVENOUS ONCE AS NEEDED
Status: DISCONTINUED | OUTPATIENT
Start: 2024-06-05 | End: 2024-06-05 | Stop reason: HOSPADM

## 2024-06-05 RX ORDER — FENTANYL CITRATE 50 UG/ML
12.5 INJECTION, SOLUTION INTRAMUSCULAR; INTRAVENOUS EVERY 5 MIN PRN
Status: DISCONTINUED | OUTPATIENT
Start: 2024-06-05 | End: 2024-06-05 | Stop reason: HOSPADM

## 2024-06-05 RX ORDER — ROCURONIUM BROMIDE 10 MG/ML
INJECTION, SOLUTION INTRAVENOUS AS NEEDED
Status: DISCONTINUED | OUTPATIENT
Start: 2024-06-05 | End: 2024-06-05

## 2024-06-05 RX ADMIN — Medication 2 L/MIN: at 12:06

## 2024-06-05 RX ADMIN — LIDOCAINE HYDROCHLORIDE 100 MG: 20 INJECTION, SOLUTION INFILTRATION; PERINEURAL at 10:20

## 2024-06-05 RX ADMIN — ONDANSETRON 4 MG: 2 INJECTION INTRAMUSCULAR; INTRAVENOUS at 10:30

## 2024-06-05 RX ADMIN — HYDROMORPHONE HYDROCHLORIDE 1 MG: 1 INJECTION, SOLUTION INTRAMUSCULAR; INTRAVENOUS; SUBCUTANEOUS at 10:47

## 2024-06-05 RX ADMIN — MIDAZOLAM HYDROCHLORIDE 2 MG: 1 INJECTION, SOLUTION INTRAMUSCULAR; INTRAVENOUS at 10:14

## 2024-06-05 RX ADMIN — OXYCODONE HYDROCHLORIDE 5 MG: 5 TABLET ORAL at 13:42

## 2024-06-05 RX ADMIN — DEXAMETHASONE SODIUM PHOSPHATE 10 MG: 4 INJECTION, SOLUTION INTRA-ARTICULAR; INTRALESIONAL; INTRAMUSCULAR; INTRAVENOUS; SOFT TISSUE at 10:30

## 2024-06-05 RX ADMIN — PROPOFOL 50 MCG/KG/MIN: 10 INJECTION, EMULSION INTRAVENOUS at 10:25

## 2024-06-05 RX ADMIN — SUGAMMADEX 150 MG: 100 INJECTION, SOLUTION INTRAVENOUS at 11:41

## 2024-06-05 RX ADMIN — ROCURONIUM BROMIDE 100 MG: 10 INJECTION, SOLUTION INTRAVENOUS at 10:20

## 2024-06-05 RX ADMIN — SUGAMMADEX 200 MG: 100 INJECTION, SOLUTION INTRAVENOUS at 11:39

## 2024-06-05 RX ADMIN — FLUORESCEIN 25 MG: 500 INJECTION INTRAVENOUS at 11:15

## 2024-06-05 RX ADMIN — Medication 100 MCG: at 10:20

## 2024-06-05 RX ADMIN — PROPOFOL 50 MG: 10 INJECTION, EMULSION INTRAVENOUS at 11:37

## 2024-06-05 RX ADMIN — FENTANYL CITRATE 100 MCG: 50 INJECTION, SOLUTION INTRAMUSCULAR; INTRAVENOUS at 10:20

## 2024-06-05 RX ADMIN — PROPOFOL 200 MG: 10 INJECTION, EMULSION INTRAVENOUS at 10:20

## 2024-06-05 RX ADMIN — FENTANYL CITRATE 50 MCG: 50 INJECTION, SOLUTION INTRAMUSCULAR; INTRAVENOUS at 12:09

## 2024-06-05 RX ADMIN — LABETALOL HYDROCHLORIDE 5 MG: 5 INJECTION INTRAVENOUS at 10:51

## 2024-06-05 RX ADMIN — HYDROMORPHONE HYDROCHLORIDE 1 MG: 1 INJECTION, SOLUTION INTRAMUSCULAR; INTRAVENOUS; SUBCUTANEOUS at 10:30

## 2024-06-05 RX ADMIN — FENTANYL CITRATE 50 MCG: 50 INJECTION, SOLUTION INTRAMUSCULAR; INTRAVENOUS at 10:58

## 2024-06-05 RX ADMIN — CEFAZOLIN 2 G: 330 INJECTION, POWDER, FOR SOLUTION INTRAMUSCULAR; INTRAVENOUS at 10:31

## 2024-06-05 RX ADMIN — SODIUM CHLORIDE, POTASSIUM CHLORIDE, SODIUM LACTATE AND CALCIUM CHLORIDE 100 ML/HR: 600; 310; 30; 20 INJECTION, SOLUTION INTRAVENOUS at 09:01

## 2024-06-05 RX ADMIN — LABETALOL HYDROCHLORIDE 10 MG: 5 INJECTION INTRAVENOUS at 10:56

## 2024-06-05 RX ADMIN — HYDRALAZINE HYDROCHLORIDE 10 MG: 20 INJECTION INTRAMUSCULAR; INTRAVENOUS at 12:57

## 2024-06-05 SDOH — HEALTH STABILITY: MENTAL HEALTH: CURRENT SMOKER: 0

## 2024-06-05 ASSESSMENT — PAIN SCALES - GENERAL
PAINLEVEL_OUTOF10: 3
PAINLEVEL_OUTOF10: 3
PAINLEVEL_OUTOF10: 2
PAINLEVEL_OUTOF10: 3
PAINLEVEL_OUTOF10: 0 - NO PAIN
PAINLEVEL_OUTOF10: 0 - NO PAIN
PAINLEVEL_OUTOF10: 3

## 2024-06-05 ASSESSMENT — PAIN - FUNCTIONAL ASSESSMENT
PAIN_FUNCTIONAL_ASSESSMENT: UNABLE TO SELF-REPORT
PAIN_FUNCTIONAL_ASSESSMENT: 0-10
PAIN_FUNCTIONAL_ASSESSMENT: UNABLE TO SELF-REPORT
PAIN_FUNCTIONAL_ASSESSMENT: 0-10
PAIN_FUNCTIONAL_ASSESSMENT: UNABLE TO SELF-REPORT
PAIN_FUNCTIONAL_ASSESSMENT: 0-10
PAIN_FUNCTIONAL_ASSESSMENT: UNABLE TO SELF-REPORT
PAIN_FUNCTIONAL_ASSESSMENT: UNABLE TO SELF-REPORT
PAIN_FUNCTIONAL_ASSESSMENT: 0-10

## 2024-06-05 ASSESSMENT — COLUMBIA-SUICIDE SEVERITY RATING SCALE - C-SSRS
6. HAVE YOU EVER DONE ANYTHING, STARTED TO DO ANYTHING, OR PREPARED TO DO ANYTHING TO END YOUR LIFE?: NO
1. IN THE PAST MONTH, HAVE YOU WISHED YOU WERE DEAD OR WISHED YOU COULD GO TO SLEEP AND NOT WAKE UP?: NO
2. HAVE YOU ACTUALLY HAD ANY THOUGHTS OF KILLING YOURSELF?: NO

## 2024-06-05 NOTE — PERIOPERATIVE NURSING NOTE
1425 - patient ambulated to restroom - able to void. Patient felt dizzy on her way back to bed. Patient now resting in bed.  called and updated. States he will leave to come to the hospital shortly    1535 - Patient dressed without incident.  at bedside. Discussed discharge instructions with couple. All questions answered. IV removed. Patient stable for discharge.

## 2024-06-05 NOTE — ANESTHESIA POSTPROCEDURE EVALUATION
Patient: Navya Echeverria    Procedure Summary       Date: 06/05/24 Room / Location: [facility] A OR 08 / Virtual [facility] A OR    Anesthesia Start: 1014 Anesthesia Stop: 1212    Procedures:       Robot Assisted Laparoscopic Total Hysterectomy      Cystoscopy Diagnosis:       Menorrhagia with regular cycle      (Menorrhagia with regular cycle [N92.0])    Surgeons: Kody Pepper MD Responsible Provider: Uvaldo Jones MD    Anesthesia Type: general ASA Status: 1            Anesthesia Type: general    Vitals Value Taken Time   /92 06/05/24 1212   Temp 36 06/05/24 1212   Pulse 85 06/05/24 1212   Resp 10 06/05/24 1212   SpO2 95 06/05/24 1212       Anesthesia Post Evaluation    Patient location during evaluation: PACU  Patient participation: complete - patient participated  Level of consciousness: awake and alert  Pain management: adequate  Airway patency: patent  Cardiovascular status: acceptable  Respiratory status: acceptable and nasal cannula  Hydration status: acceptable  Postoperative Nausea and Vomiting: none      No notable events documented.

## 2024-06-05 NOTE — PERIOPERATIVE NURSING NOTE
1206: Phase 1 care begins, pt to 2L nasal cannula  1237: Pt family updated via message  1245: Pt to room air  1250: Pt successfully voided  1257: 10 hydralazine given per emr order  1335: Pt family updated via phone call  1342: 5 oxy given per emr order

## 2024-06-05 NOTE — DISCHARGE INSTR - SUPPLEMENTARY INSTRUCTIONS
Watch for signs and symptoms of infection   -fever, chills, uncontrolled pain, swelling, discharge     No driving for 24 hours or while taking prescribed pain medication. No making important decisions for 24 hours    Take over the counter stool softners while taking pain medication    May resume normal diet. Drink plenty of fluids

## 2024-06-05 NOTE — ANESTHESIA PREPROCEDURE EVALUATION
Patient: Navya Echeverria    Procedure Information       Date/Time: 06/05/24 0930    Procedures:       Robot Assisted Laparoscopic Total Hysterectomy      Cystoscopy    Location: [facility] A OR 08 / Virtual [facility] A OR    Surgeons: Kody Pepper MD            Relevant Problems   GYN   (+) Menorrhagia with regular cycle       Clinical information reviewed:   Tobacco  Allergies  Meds   Med Hx  Surg Hx  OB Status  Fam Hx  Soc   Hx        NPO Detail:  NPO/Void Status  Carbohydrate Drink Given Prior to Surgery? : N  Date of Last Liquid: 06/05/24  Time of Last Liquid: 0700  Date of Last Solid: 06/03/24  Time of Last Solid: 1200  Last Intake Type: Clear fluids  Time of Last Void: 0842         Physical Exam    Airway  Mallampati: II  TM distance: >3 FB  Neck ROM: full     Cardiovascular    Dental - normal exam     Pulmonary    Abdominal            Anesthesia Plan    History of general anesthesia?: yes  History of complications of general anesthesia?: no    ASA 1     general     The patient is not a current smoker.    Anesthetic plan and risks discussed with patient.    Plan discussed with CRNA.

## 2024-06-05 NOTE — ANESTHESIA PROCEDURE NOTES
Airway  Date/Time: 6/5/2024 10:22 AM  Urgency: elective    Airway not difficult    Staffing  Performed: CRNA   Authorized by: Uvaldo Jones MD    Performed by: CARLOS Hutson-PARTH  Patient location during procedure: OR    Indications and Patient Condition  Indications for airway management: anesthesia and airway protection  Spontaneous ventilation: present  Sedation level: deep  Preoxygenated: yes  Patient position: sniffing  Mask difficulty assessment: 1 - vent by mask    Final Airway Details  Final airway type: endotracheal airway      Successful airway: ETT  Cuffed: yes   Successful intubation technique: direct laryngoscopy  Facilitating devices/methods: intubating stylet  Endotracheal tube insertion site: oral  Blade: Arechiga  Blade size: #2  ETT size (mm): 7.0  Cormack-Lehane Classification: grade IIa - partial view of glottis  Placement verified by: capnometry   Cuff volume (mL): 7  Measured from: lips  ETT to lips (cm): 22  Number of attempts at approach: 1

## 2024-06-05 NOTE — PERIOPERATIVE NURSING NOTE
1345 - report received from Howard POTTS. Patient VSS.     1357 - Dr. Jones approved patient for discharge at this time.     1414 - phase I care complete    1415 - Phase II

## 2024-06-05 NOTE — BRIEF OP NOTE
Date: 2024  OR Location: [facility] OR    Name: Navya Echeverria, : 1990, Age: 34 y.o., MRN: [id number], Sex: female    Diagnosis  Pre-op Diagnosis     * Menorrhagia with regular cycle [N92.0] Post-op Diagnosis     * Menorrhagia with regular cycle [N92.0]     Procedures  Robot Assisted Laparoscopic Total Hysterectomy  99945 - IN LAPAROSCOPY TOTAL HYSTERECTOMY UTERUS >250 GM    Cystoscopy  04104 - IN CYSTOURETHROSCOPY      Surgeons      * Kody Pepper - Primary    Resident/Fellow/Other Assistant:  Surgeons and Role:  * No surgeons found with a matching role *    Procedure Summary  Anesthesia: General  ASA: I  Anesthesia Staff: Anesthesiologist: Uvaldo Jones MD  CRNA: CARLOS Hutson-CRNA  Estimated Blood Loss: 20mL  Intra-op Medications: * Intraprocedure medication information is unavailable because the case start and end events have not been set *           Anesthesia Record               Intraprocedure I/O Totals          Intake    Propofol Drip 0.00 mL    The total shown is the total volume documented since Anesthesia Start was filed.    Total Intake 0 mL       Output    Urine 200 mL    Total Output 200 mL       Net    Net Volume -200 mL          Specimen:   ID Type Source Tests Collected by Time   1 : UTERUS AND CERVIX Tissue UTERUS WITH CERVIX SURGICAL PATHOLOGY EXAM Kody Pepper MD 2024 1116        Staff:   Scrub Person: Shoshana  Scrub Person: Gael  Scrub Person: Darryl  Circulator: Hair  Scrub Person: Olga Deleon Circulator: Dunia          Findings:   PROCEDURE:  The patient was taken to the operating room, placed under general   anesthesia, and placed in dorsal lithotomy position.  Pelvic exam   under anesthesia was performed and found to benign.  The patient   was then prepped and draped in normal sterile fashion.  Speculum was   placed in vagina.  The anterior lip of the cervix was grasped with a   single-tooth tenaculum.  Uterus was sounded to 8 cm and a 8cm   Fornisee was  placed without any complications.  Tenaculum was then   removed and then Fornisee was then placed without any complications.   Kruse had been placed in the bladder.  Gloves were then changed.   Attention was then turned to the abdomen.  Towel clips used to tent the umbilicus and a varies was inserted after noting normal entry with saline and normal entry pressures abdomen was insufflated with CO2 gas at that point, all the ports were placed under direct visualization first making skin incision with scalpel and then   placing the ports under direct visualization.  We had a   supraumbilical port, the left and right upper quadrant ports, upper   robotic also as well as a left lower quadrant assistant port, which   was 5 mm.  All ports were placed without any complications.  Under   direct visualization, the robot was then brought in and docked   without any complications.  Then using the vessel sealer and   ProGrasp, I started  at the mesosalpinx working our way down the broad   ligament into the round ligament cauterizing and transecting with   adequate hemostasis.  Anteriorly, I used Fornisee as a guide and I   was able to take down the bladder flap without any complications and   then the uterine arteries were cauterized and transected.  There was   adequate hemostasis.  The same procedure was performed on the   opposite side working on mesosalpinx down the broad ligament into   round ligament cauterizing and transecting with adequate hemostasis.   The bladder flap was taken down on this side so that the bladder was   completely down and then the uterine artery pedicles were cauterized   and transected.  At that point, the uterus was blanched.  The   ProGrasp was removed.  The hook was brought in and I completed a   colpotomy using the Fornisee as a guide with direct visualization with  careful attention paid to the bowel and bladder.  Once the colpotomy   was complete, we removed uterus and cervix.  Bulb suction  was placed in the vagina with   a needle at the tip and I brought the needle via the robotic arms and   then closed the vaginal cuff all the way across and senior living back   using the V-Loc suture.  The suture was then cut under direct   visualization and brought out via the robotic arm under direct   visualization.  At that point, the pelvis was irrigated and then   cleared of all clot and debris.  There was adequate hemostasis.   Ara was brought in via the assistant port and placed at the   vaginal cuff without any complications.  At that point in time,   intravenously Anesthesia had injected fluroscene for   visualization of the ureters. I removed the suction bulb from the vagina.  I disconnected the Kruse from the   catheter and retrofilled the bladder with 200 mL of saline.  Cystoscopy showed bilaterally a jet of urine from each ureter under direct visualization.  The   cystoscope was then removed.  Gloves   were then changed.  Attention was then turned to the abdomen where I   disconnected the robot.  We completed a TAP block with a total of 28 mL   of 0.5% Marcaine in 4 quadrants under direct visualization.  Then removed all ports under direct   visualization with adequate hemostasis.  Abdomen was desufflated of   the CO2 gas and then incisions were closed using 4-0 Vicryl and   Dermabond.  All needle, sponge, and instrument counts were correct.   The patient tolerated the procedure well.      Complications:  None; patient tolerated the procedure well.     Disposition: PACU - hemodynamically stable.  Condition: stable  Specimens Collected:   ID Type Source Tests Collected by Time   1 : UTERUS AND CERVIX Tissue UTERUS WITH CERVIX SURGICAL PATHOLOGY EXAM Kody Pepper MD 6/5/2024 9523     Attending Attestation: I was present and scrubbed for the entire procedure.    Kody Pepper  Phone Number: [phone]

## 2024-06-06 NOTE — OP NOTE
Date: 2024  OR Location: [facility] OR    Name: Navya Echeverria, : 1990, Age: 34 y.o., MRN: [id number], Sex: female    Diagnosis  Pre-op Diagnosis     * Menorrhagia with regular cycle [N92.0] Post-op Diagnosis     * Menorrhagia with regular cycle [N92.0]     Procedures  Robot Assisted Laparoscopic Total Hysterectomy  93976 - TN LAPAROSCOPY TOTAL HYSTERECTOMY UTERUS >250 GM    Cystoscopy  88800 - TN CYSTOURETHROSCOPY      Surgeons      * Kody Pepper - Primary    Resident/Fellow/Other Assistant:  Surgeons and Role:  * No surgeons found with a matching role *    Procedure Summary  Anesthesia: General  ASA: I  Anesthesia Staff: Anesthesiologist: Uvaldo Jones MD  CRNA: CARLOS Hutson-CRNA  Estimated Blood Loss: 20mL  Intra-op Medications: * Intraprocedure medication information is unavailable because the case start and end events have not been set *           Anesthesia Record               Intraprocedure I/O Totals          Intake    Propofol Drip 0.00 mL    The total shown is the total volume documented since Anesthesia Start was filed.    lactated Ringer's infusion 600.00 mL    Total Intake 600 mL       Output    Urine 200 mL    Total Output 200 mL       Net    Net Volume 400 mL          Specimen:   ID Type Source Tests Collected by Time   1 : UTERUS AND CERVIX Tissue UTERUS WITH CERVIX SURGICAL PATHOLOGY EXAM Kody Pepper MD 2024 1116        Staff:   Scrub Person: Shoshana  Scrub Person: Gael  Scrub Person: Darryl  Circulator: Hair  Scrub Person: Olga Deleon Circulator: Dunia         Procedure Details:  The patient was seen in the preoperative area. The site of surgery was properly noted/marked if necessary per policy. The patient has been actively warmed in preoperative area. Preoperative antibiotics have been ordered and given within 1 hours of incision. Venous thrombosis prophylaxis have been ordered including bilateral sequential compression devices    Findings:   PROCEDURE:  The  patient was taken to the operating room, placed under general   anesthesia, and placed in dorsal lithotomy position.  Pelvic exam   under anesthesia was performed and found to bengin.  The patient   was then prepped and draped in normal sterile fashion.  Speculum was   placed in vagina.  The anterior lip of the cervix was grasped with a   single-tooth tenaculum.  Uterus was sounded to 8 cm and a 8cm   Fornisee was placed without any complications.  Tenaculum was then   removed and then Fornisee was then placed without any complications.   Kruse had been placed in the bladder.  Gloves were then changed.   Attention was then turned to the abdomen.  Towel clips used to tent the umbilicus and a varies was inserted after noting normal entry with saline and normal entry pressures abdomen was insufflated with CO2 gas at that point, all the ports were placed under direct visualization first making skin incision with scalpel and then   placing the ports under direct visualization.  We had a   supraumbilical port, the left and right upper quadrant ports, upper   robotic also as well as a left lower quadrant assistant port, which   was 5 mm.  All ports were placed without any complications.  Under   direct visualization, the robot was then brought in and docked   without any complications.  It was noted that patient's tubes had already been removed.  Her ovaries were normal bilaterally then using the vessel sealer and   ProGrasp, I started  at the mesosalpinx  working our way down the broad   ligament into the round ligament cauterizing and transecting with   adequate hemostasis.  Anteriorly, I used Sera as a guide and I   was able to take down the bladder flap without any complications and   then the uterine arteries were cauterized and transected.  There was   adequate hemostasis.  The same procedure was performed on the   opposite side working on mesosalpinx down the broad ligament into   round ligament cauterizing and  transecting with adequate hemostasis.   The bladder flap was taken down on this side so that the bladder was   completely down and then the uterine artery pedicles were cauterized   and transected.  At that point, the uterus was blanched.  The   ProGrasp was removed.  The hook was brought in and I completed a   colpotomy using the Fornisee as a guide with direct visualization with  careful attention paid to the bowel and bladder.  Once the colpotomy   was complete, we removed the uterus and cervix.  Bulb suction was placed in the vagina with   a needle at the tip and I brought the needle via the robotic arms and   then closed the vaginal cuff all the way across and detention back   using the V-Loc suture.  The suture was then cut under direct   visualization and brought out via the robotic arm under direct   visualization.  At that point, the pelvis was irrigated and then   cleared of all clot and debris.  There was adequate hemostasis.   Ara was brought in via the assistant port and placed at the   vaginal cuff without any complications.  At that point in time,   intravenously Anesthesia had injected fluroscene for   visualization of the ureters. I removed the suction bulb from the vagina.  I disconnected the Kruse from the   catheter and retrofilled the bladder with 200 mL of saline.  Cystoscopy showed bilaterally a jet of urine from each ureter under direct visualization.  The   cystoscope was then removed.  Gloves   were then changed.  Attention was then turned to the abdomen where I   disconnected the robot.  We completed a TAP block with a total of 28 mL   of 0.5% Marcaine in 4 quadrants under direct visualization.  Then removed all ports under direct   visualization with adequate hemostasis.  Abdomen was desufflated of   the CO2 gas and then incisions were closed using 4-0 Vicryl and   Dermabond.  All needle, sponge, and instrument counts were correct.   The patient tolerated the procedure well.       Complications:  None; patient tolerated the procedure well.     Disposition: PACU - hemodynamically stable.  Condition: stable

## 2024-06-06 NOTE — PROGRESS NOTES
Called pt post op.  No emesis but nausea. Is urinating well.    Just took Percocet and will allow for it to work.   Surgery reviewed w pt. All as planned.

## 2024-06-20 ENCOUNTER — APPOINTMENT (OUTPATIENT)
Dept: OBSTETRICS AND GYNECOLOGY | Facility: CLINIC | Age: 34
End: 2024-06-20
Payer: COMMERCIAL

## 2024-06-20 VITALS
DIASTOLIC BLOOD PRESSURE: 70 MMHG | WEIGHT: 184 LBS | HEIGHT: 64 IN | BODY MASS INDEX: 31.41 KG/M2 | SYSTOLIC BLOOD PRESSURE: 130 MMHG

## 2024-06-20 DIAGNOSIS — Z48.89 ENCOUNTER FOR POSTOPERATIVE CARE: Primary | ICD-10-CM

## 2024-06-20 PROCEDURE — 1036F TOBACCO NON-USER: CPT | Performed by: OBSTETRICS & GYNECOLOGY

## 2024-06-20 PROCEDURE — 99024 POSTOP FOLLOW-UP VISIT: CPT | Performed by: OBSTETRICS & GYNECOLOGY

## 2024-06-20 NOTE — PROGRESS NOTES
"Subjective   Patient ID: Navya Echeverria is a 34 y.o. female who presents for Post-op (2 week Hysterectomy ).    Doing well after her surgery  Tolerating p.o.  GI and  review of systems within normal limits  Path is benign    ROS  All Normal Review of Systems  Constitutional: no fever, no chills, no recent weight gain, no recent weight loss and no fatigue.    Gastrointestinal: no abdominal pain, no constipation, no nausea, no diarrhea and no vomiting.    Genitourinary: no dysuria, no urinary incontinence, no vaginal dryness, no vaginal itching, no dyspareunia, no pelvic pain, no dysmenorrhea, no sexual problems, no change in urinary frequency, no vaginal discharge, no unexplained vaginal bleeding and no lesion/sore.    Breasts: No masses.  No nipple discharge.  No redness    Objective   /70   Ht 1.626 m (5' 4\")   Wt 83.5 kg (184 lb)   LMP  (LMP Unknown)   BMI 31.58 kg/m²    Physical Exam  General: No distress.  Alert and oriented  Abdomen: Soft, nontender, nondistended  Incisions: Clear dry intact without erythema.  Dermabond in place  Extremities: No swelling  Psych: No sadness.    Path:  A.  Uterus with cervix, total hysterectomy:  --Secretory pattern endometrium  --Myometrium with no significant histopathologic abnormalities  --Serosa with endosalpingosis  --Cervix with mild chronic inflammation      Assessment/Plan   1) 2-week postop check  Incisions are healing well  Pelvic rest and a 20 pound restriction for another 6 weeks  Please follow-up in 1 month  Your pathology was benign      Thank you for your visit to our office today.     "

## 2024-07-18 ENCOUNTER — APPOINTMENT (OUTPATIENT)
Dept: OBSTETRICS AND GYNECOLOGY | Facility: CLINIC | Age: 34
End: 2024-07-18
Payer: COMMERCIAL

## 2024-07-18 VITALS
SYSTOLIC BLOOD PRESSURE: 124 MMHG | BODY MASS INDEX: 31.41 KG/M2 | WEIGHT: 184 LBS | HEIGHT: 64 IN | DIASTOLIC BLOOD PRESSURE: 70 MMHG

## 2024-07-18 DIAGNOSIS — Z48.89 ENCOUNTER FOR POSTOPERATIVE CARE: Primary | ICD-10-CM

## 2024-07-18 PROCEDURE — 1036F TOBACCO NON-USER: CPT | Performed by: OBSTETRICS & GYNECOLOGY

## 2024-07-18 PROCEDURE — 3008F BODY MASS INDEX DOCD: CPT | Performed by: OBSTETRICS & GYNECOLOGY

## 2024-07-18 PROCEDURE — 99024 POSTOP FOLLOW-UP VISIT: CPT | Performed by: OBSTETRICS & GYNECOLOGY

## 2024-07-18 RX ORDER — ESCITALOPRAM OXALATE 5 MG/1
1 TABLET ORAL
COMMUNITY
Start: 2024-07-09

## 2024-07-18 NOTE — PROGRESS NOTES
"Subjective   Patient ID: Navya Echeverria is a 34 y.o. female who presents for Post-op (6 week follow up 06/05/2024).    6 weeks status post robotic LTH  Path is benign  Tolerating p.o.  GI and Eward systems within normal limits  Pelvic rest and 20 pound restriction for another 2 weeks    ROS  All Normal Review of Systems  Constitutional: no fever, no chills, no recent weight gain, no recent weight loss and no fatigue.    Gastrointestinal: no abdominal pain, no constipation, no nausea, no diarrhea and no vomiting.    Genitourinary: no dysuria, no urinary incontinence, no vaginal dryness, no vaginal itching, no dyspareunia, no pelvic pain, no dysmenorrhea, no sexual problems, no change in urinary frequency, no vaginal discharge, no unexplained vaginal bleeding and no lesion/sore.    Breasts: No masses.  No nipple discharge.  No redness    Objective   /70   Ht 1.626 m (5' 4\")   Wt 83.5 kg (184 lb)   LMP  (LMP Unknown)   BMI 31.58 kg/m²    Physical Exam  General: No distress.  Alert and oriented  Abdomen: Soft, nontender, nondistended  External Genitalia:  no masses.  no erythema.  no discharge noted.  Vagina:  no masses. Cuff intact.    Cervix: absent    Uterus:  absent  Adnexa: R: no masses, non tender.    Adnexa: L: no masses.  non tender  Extremities: No swelling  Psych: No sadness.    Path:  A.  Uterus with cervix, total hysterectomy:  --Secretory pattern endometrium  --Myometrium with no significant histopathologic abnormalities  --Serosa with endosalpingosis  --Cervix with mild chronic inflammation    Assessment/Plan   1) 6-week postop check  2 more weeks of pelvic rest and 20 pound restriction  No restrictions thereafter  Will follow-up as needed or with her annual exam      Thank you for your visit to our office today.     "

## 2025-02-20 ENCOUNTER — OFFICE VISIT (OUTPATIENT)
Dept: PRIMARY CARE | Facility: CLINIC | Age: 35
End: 2025-02-20
Payer: COMMERCIAL

## 2025-02-20 VITALS
OXYGEN SATURATION: 100 % | RESPIRATION RATE: 14 BRPM | DIASTOLIC BLOOD PRESSURE: 90 MMHG | WEIGHT: 171 LBS | BODY MASS INDEX: 29.19 KG/M2 | TEMPERATURE: 98 F | HEART RATE: 98 BPM | SYSTOLIC BLOOD PRESSURE: 132 MMHG | HEIGHT: 64 IN

## 2025-02-20 DIAGNOSIS — E55.9 VITAMIN D DEFICIENCY: ICD-10-CM

## 2025-02-20 DIAGNOSIS — D64.9 ANEMIA, UNSPECIFIED TYPE: ICD-10-CM

## 2025-02-20 DIAGNOSIS — F41.9 ANXIETY: Primary | ICD-10-CM

## 2025-02-20 DIAGNOSIS — F41.0 PANIC ATTACK: ICD-10-CM

## 2025-02-20 PROCEDURE — 3008F BODY MASS INDEX DOCD: CPT | Performed by: FAMILY MEDICINE

## 2025-02-20 PROCEDURE — 99203 OFFICE O/P NEW LOW 30 MIN: CPT | Performed by: FAMILY MEDICINE

## 2025-02-20 PROCEDURE — 1036F TOBACCO NON-USER: CPT | Performed by: FAMILY MEDICINE

## 2025-02-20 RX ORDER — SERTRALINE HYDROCHLORIDE 25 MG/1
TABLET, FILM COATED ORAL
Qty: 166 TABLET | Refills: 0 | Status: SHIPPED | OUTPATIENT
Start: 2025-02-20 | End: 2025-05-21

## 2025-02-20 RX ORDER — HYDROXYZINE HYDROCHLORIDE 25 MG/1
25 TABLET, FILM COATED ORAL EVERY 8 HOURS PRN
Qty: 90 TABLET | Refills: 0 | Status: SHIPPED | OUTPATIENT
Start: 2025-02-20

## 2025-02-20 RX ORDER — HYDROXYZINE HYDROCHLORIDE 25 MG/1
25 TABLET, FILM COATED ORAL EVERY 6 HOURS PRN
COMMUNITY
Start: 2025-02-12 | End: 2025-02-20 | Stop reason: SDUPTHER

## 2025-02-20 NOTE — PATIENT INSTRUCTIONS
"Anxiety  -Refilled the atarax  - start on sertralin 25mg X 2 wks then inc to 50mg thereafter  - pharm team will call you to follow up  - Placed a referral to see the Psychology team.  - Please check out [email or web address] to find your very own counselor you would feel comfortable with.  -Today I taught you how to navigate the [email or web address] website so you can pick out your therapist  ---- Suggested watching \"The Secret\" on Amazon (or on [employer] it's called: \"The Secret full movie English\")  This is a motivational video that talks about the 'Law of Attraction' (positive thinking and speaking)- a principal the can redirect how you think and help you in your daily life!    --- Try to exercise        Labs today          Please follow up in   - 4 wks for BP check  - 6 months for wellness  or as needed.       ** If labs or imaging ordered at today's visit, all the non-urgent results will be discussed at your next visit    If you have been referred for a special test or to a specialist please call  [phone] to schedule an appointment.  If you have any further questions, or if develop new or worsened symptoms, please give our office a call at [phone].       "

## 2025-02-20 NOTE — LETTER
February 20, 2025     Patient: Navya Echeverria   YOB: 1990   Date of Visit: 2/20/2025       To Whom It May Concern:    Navya Echeverria was seen in my clinic on 2/20/2025 at 12:00 pm. Please excuse Navya for her absence from work on this day to make the appointment.    If you have any questions or concerns, please don't hesitate to call.         Sincerely,         Saira Stanley,         CC: No Recipients

## 2025-02-20 NOTE — PROGRESS NOTES
"Subjective   Patient ID: Navya Echeverria is a 34 y.o. female who presents for Follow-up (BP check).    HPI     Patient last seen 4 years ago  Here for reestablish care and ER follow-up  Went to ER on 3/12/2025 due to tingling in the face with radiation down the arm.  Was diagnosed with panic attack.  States she had been with increased stress due to her family and her job  Today is tearful in the office  Denies any depression          ---Social---  Job: [employer]  : boyfriend  Kids: 4 (12/11/4/2)      Review of Systems  All systems reviewed and neg if not noted in the HPI above       Objective   /90 (Patient Position: Sitting)   Pulse 98   Temp 36.7 °C (98 °F)   Resp 14   Ht 1.626 m (5' 4\")   Wt 77.6 kg (171 lb)   LMP  (LMP Unknown)   SpO2 100%   BMI 29.35 kg/m²     Physical Exam  Constitutional:       Appearance: Normal appearance.   HENT:      Head: Normocephalic.   Eyes:      Extraocular Movements: Extraocular movements intact.      Conjunctiva/sclera: Conjunctivae normal.      Pupils: Pupils are equal, round, and reactive to light.   Pulmonary:      Effort: Pulmonary effort is normal.   Musculoskeletal:         General: No deformity or signs of injury.   Skin:     Coloration: Skin is not jaundiced.      Findings: No rash.   Neurological:      Mental Status: She is alert and oriented to person, place, and time. Mental status is at baseline.   Psychiatric:         Attention and Perception: Attention normal.         Mood and Affect: Mood is anxious. Affect is tearful.         Speech: Speech normal.         Behavior: Behavior normal.         Thought Content: Thought content normal.         Cognition and Memory: Cognition normal.         Judgment: Judgment normal.         Assessment/Plan   Assessment & Plan  Anxiety  -Refilled the atarax  - start on sertralin 25mg X 2 wks then inc to 50mg thereafter  - pharm team will call you to follow up  - Placed a referral to see the Psychology team.  - Please " "check out [email or web address] to find your very own counselor you would feel comfortable with.  -Today I taught you how to navigate the [email or web address] website so you can pick out your therapist  ---- Suggested watching \"The Secret\" on Amazon (or on YouTube it's called: \"The Secret full movie English\")  This is a motivational video that talks about the 'Law of Attraction' (positive thinking and speaking)- a principal the can redirect how you think and help you in your daily life!    --- Try to exercise  Orders:    sertraline (Zoloft) 25 mg tablet; Take 1 tablet (25 mg) by mouth once daily for 14 days, THEN 2 tablets (50 mg) once daily.    Referral to Psychology; Future    Referral to Clinical Pharmacy; Future    hydrOXYzine HCL (Atarax) 25 mg tablet; Take 1 tablet (25 mg) by mouth every 8 hours if needed for anxiety.    TSH with reflex to Free T4 if abnormal; Future    Vitamin D deficiency    Orders:    Vitamin D 25-Hydroxy,Total (for eval of Vitamin D levels); Future    Anemia, unspecified type  Status post hysterectomy  Orders:    Iron and TIBC; Future    Ferritin; Future    TSH with reflex to Free T4 if abnormal; Future    Panic attack    Orders:    sertraline (Zoloft) 25 mg tablet; Take 1 tablet (25 mg) by mouth once daily for 14 days, THEN 2 tablets (50 mg) once daily.    Referral to Psychology; Future    Referral to Clinical Pharmacy; Future    hydrOXYzine HCL (Atarax) 25 mg tablet; Take 1 tablet (25 mg) by mouth every 8 hours if needed for anxiety.      Please follow up in   - 4 wks for BP check  - 6 months for wellness  or as needed.      "

## 2025-02-21 LAB
25(OH)D3+25(OH)D2 SERPL-MCNC: 12 NG/ML (ref 30–100)
FERRITIN SERPL-MCNC: 16 NG/ML (ref 16–154)
IRON SATN MFR SERPL: 20 % (CALC) (ref 16–45)
IRON SERPL-MCNC: 69 MCG/DL (ref 40–190)
TIBC SERPL-MCNC: 351 MCG/DL (CALC) (ref 250–450)
TSH SERPL-ACNC: 1.26 MIU/L

## 2025-02-21 NOTE — ASSESSMENT & PLAN NOTE
Orders:    Vitamin D 25-Hydroxy,Total (for eval of Vitamin D levels); Future    
  Orders:    sertraline (Zoloft) 25 mg tablet; Take 1 tablet (25 mg) by mouth once daily for 14 days, THEN 2 tablets (50 mg) once daily.    Referral to Psychology; Future    Referral to Clinical Pharmacy; Future    hydrOXYzine HCL (Atarax) 25 mg tablet; Take 1 tablet (25 mg) by mouth every 8 hours if needed for anxiety.    
"-Refilled the atarax  - start on sertralin 25mg X 2 wks then inc to 50mg thereafter  - pharm team will call you to follow up  - Placed a referral to see the Psychology team.  - Please check out [email or web address] to find your very own counselor you would feel comfortable with.  -Today I taught you how to navigate the [email or web address] website so you can pick out your therapist  ---- Suggested watching \"The Secret\" on Amazon (or on [employer] it's called: \"The Secret full movie English\")  This is a motivational video that talks about the 'Law of Attraction' (positive thinking and speaking)- a principal the can redirect how you think and help you in your daily life!    --- Try to exercise  Orders:    sertraline (Zoloft) 25 mg tablet; Take 1 tablet (25 mg) by mouth once daily for 14 days, THEN 2 tablets (50 mg) once daily.    Referral to Psychology; Future    Referral to Clinical Pharmacy; Future    hydrOXYzine HCL (Atarax) 25 mg tablet; Take 1 tablet (25 mg) by mouth every 8 hours if needed for anxiety.    TSH with reflex to Free T4 if abnormal; Future    "
How Severe Is It?: moderate
Initial Anesthesia Post-op Note    [facility]  Procedure(s) Performed: RIGHT KNEE SCOPE W/ PARTIAL MEDIAL AND LATERAL MENISCECTOMY - RIGHT  Anesthesia type: General    Vitals Value Taken Time   Temp 36.4 Â°C (97.6 Â°F) 12/30/21 1625   Pulse 80 12/30/21 1625   Resp 10 12/30/21 1625   SpO2 96 % 12/30/21 1625   /84 12/30/21 1625         Patient Location: PACU Phase 1  Post-op Vital Signs:stable  Level of Consciousness: participates in exam, alert, awake and oriented  Respiratory Status: spontaneous ventilation  Cardiovascular blood pressure returned to baseline  Hydration: euvolemic  Pain Management: well controlled  Handoff: Handoff to receiving clinician was performed and questions were answered Abraham Mabry RN)  Vomiting: none  Nausea: None  Airway Patency:patent  Post-op Assessment: awake, alert, appropriately conversant, or baseline, no complications and patient tolerated procedure well with no complications      No complications documented.
Status post hysterectomy  Orders:    Iron and TIBC; Future    Ferritin; Future    TSH with reflex to Free T4 if abnormal; Future    
Is This A New Presentation, Or A Follow-Up?: Follow Up Nail Dystrophy

## 2025-02-23 ENCOUNTER — PATIENT MESSAGE (OUTPATIENT)
Dept: PRIMARY CARE | Facility: CLINIC | Age: 35
End: 2025-02-23
Payer: COMMERCIAL

## 2025-02-23 DIAGNOSIS — E55.9 VITAMIN D DEFICIENCY: Primary | ICD-10-CM

## 2025-02-24 RX ORDER — ERGOCALCIFEROL 1.25 MG/1
50000 CAPSULE ORAL
Qty: 12 CAPSULE | Refills: 0 | Status: SHIPPED | OUTPATIENT
Start: 2025-03-02 | End: 2025-02-24 | Stop reason: SDUPTHER

## 2025-02-24 RX ORDER — ERGOCALCIFEROL 1.25 MG/1
50000 CAPSULE ORAL
Qty: 12 CAPSULE | Refills: 0 | Status: SHIPPED | OUTPATIENT
Start: 2025-03-02 | End: 2025-05-25

## 2025-02-26 ENCOUNTER — APPOINTMENT (OUTPATIENT)
Dept: PRIMARY CARE | Facility: CLINIC | Age: 35
End: 2025-02-26
Payer: COMMERCIAL

## 2025-02-28 ENCOUNTER — APPOINTMENT (OUTPATIENT)
Dept: PRIMARY CARE | Facility: CLINIC | Age: 35
End: 2025-02-28
Payer: COMMERCIAL

## 2025-03-03 ENCOUNTER — APPOINTMENT (OUTPATIENT)
Dept: PRIMARY CARE | Facility: CLINIC | Age: 35
End: 2025-03-03
Payer: COMMERCIAL

## 2025-03-03 VITALS
BODY MASS INDEX: 29.53 KG/M2 | SYSTOLIC BLOOD PRESSURE: 130 MMHG | HEART RATE: 87 BPM | WEIGHT: 173 LBS | HEIGHT: 64 IN | OXYGEN SATURATION: 100 % | TEMPERATURE: 97 F | DIASTOLIC BLOOD PRESSURE: 80 MMHG | RESPIRATION RATE: 14 BRPM

## 2025-03-03 DIAGNOSIS — R20.2 PARESTHESIA: ICD-10-CM

## 2025-03-03 DIAGNOSIS — F41.9 ANXIETY: ICD-10-CM

## 2025-03-03 DIAGNOSIS — D64.9 ANEMIA, UNSPECIFIED TYPE: ICD-10-CM

## 2025-03-03 DIAGNOSIS — R42 DIZZINESS: Primary | ICD-10-CM

## 2025-03-03 DIAGNOSIS — Z13.220 SCREENING FOR HYPERLIPIDEMIA: ICD-10-CM

## 2025-03-03 DIAGNOSIS — R51.9 INTRACTABLE HEADACHE, UNSPECIFIED CHRONICITY PATTERN, UNSPECIFIED HEADACHE TYPE: ICD-10-CM

## 2025-03-03 DIAGNOSIS — F41.0 PANIC ATTACK: ICD-10-CM

## 2025-03-03 PROCEDURE — 1036F TOBACCO NON-USER: CPT | Performed by: FAMILY MEDICINE

## 2025-03-03 PROCEDURE — 99214 OFFICE O/P EST MOD 30 MIN: CPT | Performed by: FAMILY MEDICINE

## 2025-03-03 PROCEDURE — 3008F BODY MASS INDEX DOCD: CPT | Performed by: FAMILY MEDICINE

## 2025-03-03 RX ORDER — FLUOXETINE 20 MG/1
20 TABLET ORAL DAILY
Qty: 30 TABLET | Refills: 0 | Status: SHIPPED | OUTPATIENT
Start: 2025-03-03 | End: 2025-04-02

## 2025-03-03 ASSESSMENT — PATIENT HEALTH QUESTIONNAIRE - PHQ9
1. LITTLE INTEREST OR PLEASURE IN DOING THINGS: NOT AT ALL
SUM OF ALL RESPONSES TO PHQ9 QUESTIONS 1 AND 2: 0
2. FEELING DOWN, DEPRESSED OR HOPELESS: NOT AT ALL

## 2025-03-03 NOTE — ASSESSMENT & PLAN NOTE
Orders:    FLUoxetine (PROzac) 20 mg tablet; Take 1 tablet (20 mg) by mouth once daily.    Referral to [facility]; Future

## 2025-03-03 NOTE — PATIENT INSTRUCTIONS
Anxiety  -  hydroxyzine as needed- - wing at night  - start on Fluoxtetine  - to try to find a counselor  - referral for psych access clinic      Dizziness with headache:  - MRI ordered  - Referral for neurology    Continue Vitamin D    Labs for anemia    Please follow up in   - as scheduled in AUG  or as needed.       ** If labs or imaging ordered at today's visit, all the non-urgent results will be discussed at your next visit    If you have been referred for a special test or to a specialist please call  [phone] to schedule an appointment.  If you have any further questions, or if develop new or worsened symptoms, please give our office a call at [phone].

## 2025-03-03 NOTE — PROGRESS NOTES
"Subjective   Patient ID: Navya Echeverria is a 34 y.o. female who presents for Follow-up (BP/Dizziness).    HPI   With some daily lightheaded With pressure behind the right eye, weakness and paresthia  in the face and arm- stopped the SSRI to see if this helps- but did not X 2 months now  Sometimes with With chest tightness at night- using hydroxyzine- prn in the am- just helps some  The chest tightness- Better after deep breathing-          Review of Systems  All systems reviewed and neg if not noted in the HPI above       Objective   /80 (Patient Position: Sitting)   Pulse 87   Temp 36.1 °C (97 °F)   Resp 14   Ht 1.626 m (5' 4\")   Wt 78.5 kg (173 lb)   LMP  (LMP Unknown)   SpO2 100%   BMI 29.70 kg/m²     Physical Exam  Vitals reviewed.   Constitutional:       Appearance: Normal appearance.   HENT:      Head: Normocephalic.   Eyes:      Extraocular Movements: Extraocular movements intact.   Cardiovascular:      Rate and Rhythm: Normal rate and regular rhythm.      Heart sounds: Normal heart sounds. No murmur heard.  Pulmonary:      Effort: Pulmonary effort is normal. No respiratory distress.      Breath sounds: Normal breath sounds. No wheezing.   Abdominal:      General: Bowel sounds are normal. There is no distension.      Palpations: Abdomen is soft.   Skin:     General: Skin is warm and dry.   Neurological:      General: No focal deficit present.      Mental Status: She is alert and oriented to person, place, and time.      Cranial Nerves: Cranial nerves 2-12 are intact.      Comments: Sanborn-Hallpike test- neg nystagmus B/L,   but with subjective dizziness when in sitting to supine position B/L, left >Right and with sitting up as well  Also with laying on the right side with eye pressure and pain as well as numness in the face with numbness in the right UE     Psychiatric:         Mood and Affect: Mood normal.         Behavior: Behavior normal.         Thought Content: Thought content normal.       "   Judgment: Judgment normal.         Assessment/Plan   Assessment & Plan  Anxiety    Orders:    FLUoxetine (PROzac) 20 mg tablet; Take 1 tablet (20 mg) by mouth once daily.    [facility]; Future    Panic attack    Orders:    FLUoxetine (PROzac) 20 mg tablet; Take 1 tablet (20 mg) by mouth once daily.    Referral to [facility]; Future    Anemia, unspecified type    Orders:    CBC and Auto Differential; Future    Vitamin B12; Future    Folate; Future    Intractable headache, unspecified chronicity pattern, unspecified headache type    Orders:    Referral to Neurology; Future    MR brain w and wo IV contrast; Future    Dizziness    Orders:    Referral to Neurology; Future    MR brain w and wo IV contrast; Future    Paresthesia    Orders:    Referral to Neurology; Future    MR brain w and wo IV contrast; Future             Please follow up in   - as scheduled in AUG  or as needed.

## 2025-03-04 ENCOUNTER — APPOINTMENT (OUTPATIENT)
Dept: PHARMACY | Facility: HOSPITAL | Age: 35
End: 2025-03-04
Payer: COMMERCIAL

## 2025-03-04 DIAGNOSIS — F41.9 ANXIETY: ICD-10-CM

## 2025-03-04 DIAGNOSIS — F41.0 PANIC ATTACK: ICD-10-CM

## 2025-03-04 LAB
BASOPHILS # BLD AUTO: 40 CELLS/UL (ref 0–200)
BASOPHILS NFR BLD AUTO: 0.8 %
CHOLEST SERPL-MCNC: 165 MG/DL
CHOLEST/HDLC SERPL: 4.5 (CALC)
EOSINOPHIL # BLD AUTO: 130 CELLS/UL (ref 15–500)
EOSINOPHIL NFR BLD AUTO: 2.6 %
ERYTHROCYTE [DISTWIDTH] IN BLOOD BY AUTOMATED COUNT: 13.8 % (ref 11–15)
FOLATE SERPL-MCNC: 18.5 NG/ML
HCT VFR BLD AUTO: 33.4 % (ref 35–45)
HDLC SERPL-MCNC: 37 MG/DL
HGB BLD-MCNC: 10.3 G/DL (ref 11.7–15.5)
LDLC SERPL CALC-MCNC: 103 MG/DL (CALC)
LYMPHOCYTES # BLD AUTO: 1320 CELLS/UL (ref 850–3900)
LYMPHOCYTES NFR BLD AUTO: 26.4 %
MCH RBC QN AUTO: 24.7 PG (ref 27–33)
MCHC RBC AUTO-ENTMCNC: 30.8 G/DL (ref 32–36)
MCV RBC AUTO: 80.1 FL (ref 80–100)
MONOCYTES # BLD AUTO: 325 CELLS/UL (ref 200–950)
MONOCYTES NFR BLD AUTO: 6.5 %
NEUTROPHILS # BLD AUTO: 3185 CELLS/UL (ref 1500–7800)
NEUTROPHILS NFR BLD AUTO: 63.7 %
NONHDLC SERPL-MCNC: 128 MG/DL (CALC)
PLATELET # BLD AUTO: 309 THOUSAND/UL (ref 140–400)
PMV BLD REES-ECKER: 13.4 FL (ref 7.5–12.5)
RBC # BLD AUTO: 4.17 MILLION/UL (ref 3.8–5.1)
TRIGL SERPL-MCNC: 153 MG/DL
VIT B12 SERPL-MCNC: 404 PG/ML (ref 200–1100)
WBC # BLD AUTO: 5 THOUSAND/UL (ref 3.8–10.8)

## 2025-03-04 NOTE — ASSESSMENT & PLAN NOTE
KARYN-7- 16  Rationale for plan: pt did not tolerate sertraline (out of body experience/floating). Pt reports when body feels off, headaches, tension in neck and shoulders, leads to anxiousness and panic attack. States having bad health is what makes her anxious. Been trying to change diet and exercise. Aware to start counseling. States when taking vitamin D, feels better. Advised to start Fluoxetine 20 mg  QAM and take hydroxyzine 25mg PRN. Will reassess in 4 weeks to determine if at efficacious dose/tolerability. Reviewed non-pharm methods. Pt aware to call clinical pharmacy if any questions/concerns arise.     Medication Changes:  CONTINUE  Fluoxetine 20 mg   Hydroxyzine HCL 25 mg q8h prn anxiety      Monitoring and Education:  May consider increasing fluoxetine to 40 mg if req.   May consider hydroxyzine 12.5 mg if extreme sedation.   Utilize crisis/emergency resources if needed. Call 988 for the Suicide and Crisis Lifeline or go to the nearest emergency department if you feel unsafe, or have suicidal thinking with a plan or intent.  Counseled patient on MOA, expectations, duration of therapy, contraindications, administration, and monitoring parameters.  Answered all patient questions and concerns.

## 2025-03-04 NOTE — PROGRESS NOTES
"  Clinical Pharmacy Appointment    Patient ID: Navya Echeverria is a 34 y.o. female who presents for Anxiety.    Pt is here for First appointment.     Referring Provider: Saira Stanley, DO      Subjective       HPI  ANXIETY  Does patient follow with behavioral health?: No    Current anxiety medications include:  Fluoxetine 20 mg   Hydroxyzine HCL 25 mg q8h prn anxiety    Clarifications to above regimen: has not started fluoxetine  Adverse Effects: sedating while taking hydroxyzine    Medication History  Medication Name Dose Adequate Trial? Effective? Adverse Effects Notes   Sertraline 25 mg  25 mg no no \"Out of body,floating\"    Escitalopram NA NA NA Lock jaw              Substance Use  Alcohol use: Average 0 drinks/week  Other substance use/abuse: no      KARYN-7  Over the last 2 weeks, how often have you been bothered by the following problems?  Feeling nervous, anxious or on edge: Nearly every day (+3)  Not being able to stop or control worrying: More than half the days (+2)  Worrying too much about different things: Nearly every day (+3)  Trouble relaxing: Nearly every day (+3)  Being so restless that it is hard to sit still: Not at all (+0)  Becoming easily annoyed or irritable: More than half the days (+2)  Feeling afraid as if something awful might happen: Nearly every day (+3)    Previous KARYN-7 Score: NA (NA)  Current KARYN-7 Score: 16          Medication Reconciliation:  Changed:   Added:   Discontinued:     Drug Interactions  No relevant drug interactions were noted.      Objective   Allergies   Allergen Reactions    Shellfish Derived Anaphylaxis    Aspirin Hives and Rash    Penicillin Rash    Sertraline Anxiety     Social History     Social History Narrative    Not on file        Vitals  BP Readings from Last 2 Encounters:   03/03/25 130/80   02/20/25 132/90     BMI Readings from Last 1 Encounters:   03/03/25 29.70 kg/m²      Labs  A1C  No results found for: \"HGBA1C\"  BMP  Lab Results   Component Value " "Date    CALCIUM 9.7 06/18/2021     06/18/2021    K 3.7 06/18/2021    CO2 27 06/18/2021     06/18/2021    BUN 8 06/18/2021    CREATININE 0.75 06/18/2021     LFTs  Lab Results   Component Value Date    ALT 10 06/18/2021    AST 12 06/18/2021    ALKPHOS 41 06/18/2021    BILITOT 0.5 06/18/2021     FLP  Lab Results   Component Value Date    TRIG 153 (H) 03/03/2025    CHOL 165 03/03/2025    LDLCALC 103 (H) 03/03/2025    HDL 37 (L) 03/03/2025     Urine Microalbumin  No results found for: \"MICROALBCREA\"  Weight Management  Wt Readings from Last 3 Encounters:   03/03/25 78.5 kg (173 lb)   02/20/25 77.6 kg (171 lb)   07/18/24 83.5 kg (184 lb)      There is no height or weight on file to calculate BMI.     Assessment/Plan   Problem List Items Addressed This Visit       Anxiety     KARYN-7- 16  Rationale for plan: pt did not tolerate sertraline (out of body experience/floating). Pt reports when body feels off, headaches, tension in neck and shoulders, leads to anxiousness and panic attack. States having bad health is what makes her anxious. Been trying to change diet and exercise. Aware to start counseling. States when taking vitamin D, feels better. Advised to start Fluoxetine 20 mg  QAM and take hydroxyzine 25mg PRN. Will reassess in 4 weeks to determine if at efficacious dose/tolerability. Reviewed non-pharm methods. Pt aware to call clinical pharmacy if any questions/concerns arise.     Medication Changes:  CONTINUE  Fluoxetine 20 mg   Hydroxyzine HCL 25 mg q8h prn anxiety      Monitoring and Education:  May consider increasing fluoxetine to 40 mg if req.   May consider hydroxyzine 12.5 mg if extreme sedation.   Utilize crisis/emergency resources if needed. Call 988 for the Suicide and Crisis Lifeline or go to the nearest emergency department if you feel unsafe, or have suicidal thinking with a plan or intent.  Counseled patient on MOA, expectations, duration of therapy, contraindications, administration, and " monitoring parameters.  Answered all patient questions and concerns.           Panic attack       Clinical Pharmacist follow-up: 4/8 10am,    Continue all meds under the continuation of care with the referring provider and clinical pharmacy team.    Thank you,  Dianelys Kendrick PharmD [facility]  Clinical Pharmacy Resident, Primary Care     Verbal consent to manage patient's drug therapy was obtained from the patient. They were informed they may decline to participate or withdraw from participation in pharmacy services at any time.

## 2025-03-07 ENCOUNTER — APPOINTMENT (OUTPATIENT)
Dept: PRIMARY CARE | Facility: CLINIC | Age: 35
End: 2025-03-07
Payer: COMMERCIAL

## 2025-03-14 DIAGNOSIS — F41.0 PANIC ATTACK: ICD-10-CM

## 2025-03-14 DIAGNOSIS — F41.9 ANXIETY: ICD-10-CM

## 2025-03-14 RX ORDER — HYDROXYZINE HYDROCHLORIDE 25 MG/1
25 TABLET, FILM COATED ORAL EVERY 8 HOURS PRN
Qty: 270 TABLET | Refills: 0 | Status: SHIPPED | OUTPATIENT
Start: 2025-03-14

## 2025-03-18 DIAGNOSIS — F41.0 PANIC ATTACK: ICD-10-CM

## 2025-03-18 DIAGNOSIS — F41.9 ANXIETY: ICD-10-CM

## 2025-03-19 ENCOUNTER — APPOINTMENT (OUTPATIENT)
Dept: RADIOLOGY | Facility: HOSPITAL | Age: 35
End: 2025-03-19
Payer: COMMERCIAL

## 2025-03-19 ENCOUNTER — PATIENT MESSAGE (OUTPATIENT)
Dept: PRIMARY CARE | Facility: CLINIC | Age: 35
End: 2025-03-19
Payer: COMMERCIAL

## 2025-03-19 ENCOUNTER — CLINICAL SUPPORT (OUTPATIENT)
Dept: EMERGENCY MEDICINE | Facility: HOSPITAL | Age: 35
End: 2025-03-19
Payer: COMMERCIAL

## 2025-03-19 ENCOUNTER — HOSPITAL ENCOUNTER (EMERGENCY)
Facility: HOSPITAL | Age: 35
Discharge: HOME | End: 2025-03-19
Attending: STUDENT IN AN ORGANIZED HEALTH CARE EDUCATION/TRAINING PROGRAM
Payer: COMMERCIAL

## 2025-03-19 VITALS
HEART RATE: 74 BPM | TEMPERATURE: 96 F | SYSTOLIC BLOOD PRESSURE: 168 MMHG | RESPIRATION RATE: 18 BRPM | DIASTOLIC BLOOD PRESSURE: 95 MMHG | HEIGHT: 65 IN | OXYGEN SATURATION: 99 % | WEIGHT: 170 LBS | BODY MASS INDEX: 28.32 KG/M2

## 2025-03-19 DIAGNOSIS — R03.0 ELEVATED BLOOD PRESSURE READING: ICD-10-CM

## 2025-03-19 DIAGNOSIS — R07.9 CHEST PAIN, UNSPECIFIED TYPE: Primary | ICD-10-CM

## 2025-03-19 LAB
ALBUMIN SERPL BCP-MCNC: 4.5 G/DL (ref 3.4–5)
ALP SERPL-CCNC: 39 U/L (ref 33–110)
ALT SERPL W P-5'-P-CCNC: 8 U/L (ref 7–45)
ANION GAP SERPL CALC-SCNC: 11 MMOL/L (ref 10–20)
AST SERPL W P-5'-P-CCNC: 11 U/L (ref 9–39)
BASOPHILS # BLD AUTO: 0.03 X10*3/UL (ref 0–0.1)
BASOPHILS NFR BLD AUTO: 0.4 %
BILIRUB SERPL-MCNC: 0.4 MG/DL (ref 0–1.2)
BUN SERPL-MCNC: 7 MG/DL (ref 6–23)
CALCIUM SERPL-MCNC: 10 MG/DL (ref 8.6–10.6)
CARDIAC TROPONIN I PNL SERPL HS: <3 NG/L (ref 0–34)
CARDIAC TROPONIN I PNL SERPL HS: <3 NG/L (ref 0–34)
CHLORIDE SERPL-SCNC: 108 MMOL/L (ref 98–107)
CO2 SERPL-SCNC: 25 MMOL/L (ref 21–32)
CREAT SERPL-MCNC: 0.7 MG/DL (ref 0.5–1.05)
D DIMER PPP FEU-MCNC: 568 NG/ML FEU
EGFRCR SERPLBLD CKD-EPI 2021: >90 ML/MIN/1.73M*2
EOSINOPHIL # BLD AUTO: 0.03 X10*3/UL (ref 0–0.7)
EOSINOPHIL NFR BLD AUTO: 0.4 %
ERYTHROCYTE [DISTWIDTH] IN BLOOD BY AUTOMATED COUNT: 13.3 % (ref 11.5–14.5)
GLUCOSE SERPL-MCNC: 88 MG/DL (ref 74–99)
HCT VFR BLD AUTO: 31.8 % (ref 36–46)
HGB BLD-MCNC: 10.2 G/DL (ref 12–16)
IMM GRANULOCYTES # BLD AUTO: 0.03 X10*3/UL (ref 0–0.7)
IMM GRANULOCYTES NFR BLD AUTO: 0.4 % (ref 0–0.9)
LYMPHOCYTES # BLD AUTO: 0.94 X10*3/UL (ref 1.2–4.8)
LYMPHOCYTES NFR BLD AUTO: 13.7 %
MAGNESIUM SERPL-MCNC: 2.2 MG/DL (ref 1.6–2.4)
MCH RBC QN AUTO: 24.3 PG (ref 26–34)
MCHC RBC AUTO-ENTMCNC: 32.1 G/DL (ref 32–36)
MCV RBC AUTO: 76 FL (ref 80–100)
MONOCYTES # BLD AUTO: 0.25 X10*3/UL (ref 0.1–1)
MONOCYTES NFR BLD AUTO: 3.6 %
NEUTROPHILS # BLD AUTO: 5.58 X10*3/UL (ref 1.2–7.7)
NEUTROPHILS NFR BLD AUTO: 81.5 %
NRBC BLD-RTO: 0 /100 WBCS (ref 0–0)
PLATELET # BLD AUTO: 309 X10*3/UL (ref 150–450)
POTASSIUM SERPL-SCNC: 3.5 MMOL/L (ref 3.5–5.3)
PROT SERPL-MCNC: 8 G/DL (ref 6.4–8.2)
RBC # BLD AUTO: 4.2 X10*6/UL (ref 4–5.2)
SODIUM SERPL-SCNC: 140 MMOL/L (ref 136–145)
TSH SERPL-ACNC: 0.7 MIU/L (ref 0.44–3.98)
WBC # BLD AUTO: 6.9 X10*3/UL (ref 4.4–11.3)

## 2025-03-19 PROCEDURE — 36415 COLL VENOUS BLD VENIPUNCTURE: CPT | Performed by: PHYSICIAN ASSISTANT

## 2025-03-19 PROCEDURE — 84484 ASSAY OF TROPONIN QUANT: CPT | Performed by: PHYSICIAN ASSISTANT

## 2025-03-19 PROCEDURE — 83735 ASSAY OF MAGNESIUM: CPT | Performed by: STUDENT IN AN ORGANIZED HEALTH CARE EDUCATION/TRAINING PROGRAM

## 2025-03-19 PROCEDURE — 85379 FIBRIN DEGRADATION QUANT: CPT | Performed by: PHYSICIAN ASSISTANT

## 2025-03-19 PROCEDURE — 93005 ELECTROCARDIOGRAM TRACING: CPT

## 2025-03-19 PROCEDURE — 85025 COMPLETE CBC W/AUTO DIFF WBC: CPT | Performed by: PHYSICIAN ASSISTANT

## 2025-03-19 PROCEDURE — 84443 ASSAY THYROID STIM HORMONE: CPT | Performed by: PHYSICIAN ASSISTANT

## 2025-03-19 PROCEDURE — 99285 EMERGENCY DEPT VISIT HI MDM: CPT | Mod: 25 | Performed by: STUDENT IN AN ORGANIZED HEALTH CARE EDUCATION/TRAINING PROGRAM

## 2025-03-19 PROCEDURE — 2500000004 HC RX 250 GENERAL PHARMACY W/ HCPCS (ALT 636 FOR OP/ED): Performed by: PHYSICIAN ASSISTANT

## 2025-03-19 PROCEDURE — 71046 X-RAY EXAM CHEST 2 VIEWS: CPT | Performed by: STUDENT IN AN ORGANIZED HEALTH CARE EDUCATION/TRAINING PROGRAM

## 2025-03-19 PROCEDURE — 96374 THER/PROPH/DIAG INJ IV PUSH: CPT

## 2025-03-19 PROCEDURE — 80053 COMPREHEN METABOLIC PANEL: CPT | Performed by: PHYSICIAN ASSISTANT

## 2025-03-19 PROCEDURE — 71046 X-RAY EXAM CHEST 2 VIEWS: CPT

## 2025-03-19 RX ORDER — FLUOXETINE 20 MG/1
20 TABLET ORAL DAILY
Qty: 90 TABLET | Refills: 0 | Status: SHIPPED | OUTPATIENT
Start: 2025-03-19

## 2025-03-19 RX ORDER — IBUPROFEN 600 MG/1
600 TABLET ORAL EVERY 6 HOURS PRN
Qty: 20 TABLET | Refills: 0 | Status: SHIPPED | OUTPATIENT
Start: 2025-03-19 | End: 2025-03-29

## 2025-03-19 RX ORDER — KETOROLAC TROMETHAMINE 30 MG/ML
30 INJECTION, SOLUTION INTRAMUSCULAR; INTRAVENOUS ONCE
Status: COMPLETED | OUTPATIENT
Start: 2025-03-19 | End: 2025-03-19

## 2025-03-19 RX ADMIN — KETOROLAC TROMETHAMINE 30 MG: 30 INJECTION, SOLUTION INTRAMUSCULAR; INTRAVENOUS at 12:22

## 2025-03-19 ASSESSMENT — PAIN DESCRIPTION - DESCRIPTORS: DESCRIPTORS: HEAVINESS

## 2025-03-19 NOTE — Clinical Note
Navya Echeverria was seen and treated in our emergency department on 3/19/2025.  She may return to work on 03/20/2025.       If you have any questions or concerns, please don't hesitate to call.      Carmella Winkler PA-C

## 2025-03-19 NOTE — DISCHARGE INSTRUCTIONS
Please follow up with your primary care physician or cardiologist for further management of this issue.  Your need to be re-evaluated over the next 2 to 3 days if you are not improving, and sooner  if your symptoms worsen.   Return to the ER for new, worse, or concerning symptoms, such as shortness of breath, worsening pain, nausea, vomiting, or feeling sweaty.    As we discussed, although I do not expect your condition to worsen, there is diagnostic and  certainty at this time in the potential for your condition to change it worsen. If you have ANY concerns or feel like your condition is changing/worsening, please RETURN TO THE ER to be reevaluated.

## 2025-03-19 NOTE — ED PROVIDER NOTES
Emergency Department Provider Note        History of Present Illness     History provided by: Patient  Limitations to History: None  External Records Reviewed with Brief Summary:  ccf notes 2/12    HPI:  Navya Echeverria is a 34 y.o. female who is previously healthy who presents today for evaluation of chest pain, patient states that the chest pain is been happening about every week since she was seen at [facility] on February 12, patient describes it as a tightness on the right side of her chest that migrates to the left side, she at that time was also having neck and back pain but states that that has been constant since she left where the chest pain keeps coming and going.  She had it again yesterday while she was driving, she states that the keep writing it off his anxiety but states that she does not feel anxious when it happens, states that the chest pain typically happens without any real pattern and then her heart starts racing because she starts getting worried that she is having a heart attack.  Patient does have a familial cardiac history in both grandparents in their 60s having heart attacks, she denies diagnosis of hypertension hyperlipidemia or diabetes, she does not smoke.  Her blood pressure is notably elevated here however she states that her primary care doctor is not treating the blood pressure, they are treating her anxiety, states that she is not taking the Lexapro and Atarax that she is prescribed because she does not believe that is what is going on.  Patient denies any pleuritic component, denies hemoptysis, denies leg pain or swelling, history of blood clots, malignancy, hormone use, recent surgeries hospitalizations or travel.  She denies any recent working out, strenuous activity or moving furniture.  She denies any exertional worsening.    Physical Exam   Triage vitals:  T 35.6 °C (96 °F)  HR (!) 116  BP (!) 176/109  RR 16  O2 100 %      Physical Exam  Vitals and nursing note  reviewed.   Constitutional:       General: She is not in acute distress.     Appearance: Normal appearance. She is not toxic-appearing.   HENT:      Head: Normocephalic and atraumatic.      Nose: Nose normal.   Eyes:      Extraocular Movements: Extraocular movements intact.   Cardiovascular:      Rate and Rhythm: Normal rate and regular rhythm.   Pulmonary:      Effort: Pulmonary effort is normal.   Chest:       Abdominal:      Palpations: Abdomen is soft.   Musculoskeletal:         General: Normal range of motion.      Cervical back: Normal range of motion and neck supple.   Skin:     General: Skin is warm and dry.   Neurological:      General: No focal deficit present.      Mental Status: She is alert.   Psychiatric:         Mood and Affect: Mood normal.         Thought Content: Thought content normal.        XR chest 2 views   Final Result   1.  No evidence of acute cardiopulmonary process.                  MACRO:   None        Signed by: Ash Cabrera 3/19/2025 12:02 PM   Dictation workstation:   [id number]        Labs Reviewed   CBC WITH AUTO DIFFERENTIAL - Abnormal       Result Value    WBC 6.9      nRBC 0.0      RBC 4.20      Hemoglobin 10.2 (*)     Hematocrit 31.8 (*)     MCV 76 (*)     MCH 24.3 (*)     MCHC 32.1      RDW 13.3      Platelets 309      Neutrophils % 81.5      Immature Granulocytes %, Automated 0.4      Lymphocytes % 13.7      Monocytes % 3.6      Eosinophils % 0.4      Basophils % 0.4      Neutrophils Absolute 5.58      Immature Granulocytes Absolute, Automated 0.03      Lymphocytes Absolute 0.94 (*)     Monocytes Absolute 0.25      Eosinophils Absolute 0.03      Basophils Absolute 0.03     COMPREHENSIVE METABOLIC PANEL - Abnormal    Glucose 88      Sodium 140      Potassium 3.5      Chloride 108 (*)     Bicarbonate 25      Anion Gap 11      Urea Nitrogen 7      Creatinine 0.70      eGFR >90      Calcium 10.0      Albumin 4.5      Alkaline Phosphatase 39      Total Protein 8.0      AST 11       Bilirubin, Total 0.4      ALT 8     D-DIMER, VTE EXCLUSION - Abnormal    D-Dimer, Quantitative VTE Exclusion 568 (*)     Narrative:     The VTE Exclusion D-Dimer assay is reported in ng/mL Fibrinogen Equivalent Units (FEU).    Per 's instructions for use, a value of less than 500 ng/mL (FEU) may help to exclude DVT or PE in outpatients when the assay is used with a clinical pretest probability assessment.(AEMR must utilize and document eCalc 'Wells Score Deep Vein Thrombosis Risk' for DVT exclusion only. Emergency Department should utilize  Guidelines for Emergency Department Use of the VTE Exclusion D-Dimer and Clinical Pretest probability assessment model for DVT or PE exclusion.)   TSH WITH REFLEX TO FREE T4 IF ABNORMAL - Normal    Thyroid Stimulating Hormone 0.70      Narrative:     TSH testing is performed using different testing methodology at [facility] than at other [facility]. Direct result comparisons should only be made within the same method.     SERIAL TROPONIN-INITIAL - Normal    Troponin I, High Sensitivity (CMC) <3      Narrative:     Less than 99th percentile of normal range cutoff-  Female and children under 18 years old <35 ng/L; Male <54 ng/L: Negative  Repeat testing should be performed if clinically indicated.     Female and children under 18 years old  ng/L; Male  ng/L:  Consistent with possible cardiac damage and possible increased clinical   risk. Serial measurements may help to assess extent of myocardial damage.     >120 ng/L: Consistent with cardiac damage, increased clinical risk and  myocardial infarction. Serial measurements may help assess extent of   myocardial damage.      NOTE: Children less than 1 year old may have higher baseline troponin   levels and results should be interpreted in conjunction with the overall   clinical context.    NOTE: Troponin I testing is performed using a different   testing methodology at [facility] than at other   system hospitals. Direct result comparisons should only   be made within the same method.     SERIAL TROPONIN, 1 HOUR - Normal    Troponin I, High Sensitivity (CMC) <3      Narrative:     Less than 99th percentile of normal range cutoff-  Female and children under 18 years old <35 ng/L; Male <54 ng/L: Negative  Repeat testing should be performed if clinically indicated.     Female and children under 18 years old  ng/L; Male  ng/L:  Consistent with possible cardiac damage and possible increased clinical   risk. Serial measurements may help to assess extent of myocardial damage.     >120 ng/L: Consistent with cardiac damage, increased clinical risk and  myocardial infarction. Serial measurements may help assess extent of   myocardial damage.      NOTE: Children less than 1 year old may have higher baseline troponin   levels and results should be interpreted in conjunction with the overall   clinical context.    NOTE: Troponin I testing is performed using a different   testing methodology at [facility] than at other   [facility]. Direct result comparisons should only   be made within the same method.     MAGNESIUM - Normal    Magnesium 2.20     TROPONIN SERIES- (INITIAL, 1 HR)    Narrative:     The following orders were created for panel order Troponin I Series, High Sensitivity (0, 1 HR).  Procedure                               Abnormality         Status                     ---------                               -----------         ------                     Troponin I, High Sensiti...[[id number]]  Normal              Final result               Troponin, High Sensitivi...[[id number]]  Normal              Final result                 Please view results for these tests on the individual orders.     ED Course as of 03/19/25 1447   Wed Mar 19, 2025   1043 ECG 12 lead  Sinus tachycardia with rate of 126, , QRS 70, QTc 466, normal axis, no ischemic changes.  No  prior EKG available for comparison. []   1258 D-Dimer, Quantitative VTE Exclusion(!): 568  Ruled out by years criteria []      ED Course User Index  [] Carmella Winkler PA-C         Diagnoses as of 25 1447   Chest pain, unspecified type   Elevated blood pressure reading         Medical Decision Making & ED Course   Medical Decision Makin y.o. female for evaluation of chest pain, see above HPI and physical exam, patient is initially noted to be tachycardic, D-dimer, TSH, magnesium, cardiac workup including troponins and chest x-ray were obtained.  X-ray without acute cardiopulmonary process, troponins negative x 2, her tachycardia had improved on reassessment, chest pain improved with Toradol, suspect musculoskeletal etiology.  D-dimer was under 1000, PE ruled out by years criteria, TSH magnesium within normal limits.  Patient was reassessed, discussed with her follow-up for her elevated blood pressure reading as well as for his chest pain although I suspect this is musculoskeletal in nature will send her home with ibuprofen 600 mg.  She has a aspirin allergy but has tolerated ibuprofen in the past.  She is aware of signs and symptoms that warrant return to the ER, stable for discharge at this time.  She was referred to cardiology.  Heart score 1.   ----      Differential diagnoses considered include but are not limited to: Musculoskeletal chest pain, anxiety, PE, hyperthyroidism, ACS, CAD, pneumothorax, etc.     Social Determinants of Health which Significantly Impact Care: None identified     EKG Independent Interpretation: EKG interpreted by myself. Please see ED Course for full interpretation.    Independent Result Review and Interpretation: Relevant laboratory and radiographic results were reviewed and independently interpreted by myself.  As necessary, they are commented on in the ED Course.    Chronic conditions affecting the patient's care: As documented above in MDM    The patient was  discussed with the following consultants/services: None    Care Considerations: As documented above in [facility]    ED Course:  ED Course as of 03/19/25 1447   Wed Mar 19, 2025   1043 ECG 12 lead  Sinus tachycardia with rate of 126, , QRS 70, QTc 466, normal axis, no ischemic changes.  No prior EKG available for comparison. []   1258 D-Dimer, Quantitative VTE Exclusion(!): 568  Ruled out by years criteria [MC]      ED Course User Index  [] Carmella Winkler PA-C         Diagnoses as of 03/19/25 1447   Chest pain, unspecified type   Elevated blood pressure reading     Disposition   As a result of the work-up, the patient was discharged home.  she was informed of her diagnosis and instructed to come back with any concerns or worsening of condition.  she and was agreeable to the plan as discussed above.  she was given the opportunity to ask questions.  All of the patient's questions were answered.    Procedures   Procedures    This was a shared visit with an ED attending.  The patient was seen and discussed with the ED attending    Carmella Winkler PA-C  Emergency Medicine       Carmella Winkler PA-C  03/19/25 144

## 2025-03-20 ENCOUNTER — APPOINTMENT (OUTPATIENT)
Dept: PRIMARY CARE | Facility: CLINIC | Age: 35
End: 2025-03-20
Payer: COMMERCIAL

## 2025-03-20 LAB
ATRIAL RATE: 126 BPM
P AXIS: 75 DEGREES
P OFFSET: 205 MS
P ONSET: 156 MS
PR INTERVAL: 130 MS
Q ONSET: 221 MS
QRS COUNT: 21 BEATS
QRS DURATION: 70 MS
QT INTERVAL: 322 MS
QTC CALCULATION(BAZETT): 466 MS
QTC FREDERICIA: 412 MS
R AXIS: 77 DEGREES
T AXIS: 64 DEGREES
T OFFSET: 382 MS
VENTRICULAR RATE: 126 BPM

## 2025-03-21 ENCOUNTER — APPOINTMENT (OUTPATIENT)
Dept: RADIOLOGY | Facility: HOSPITAL | Age: 35
End: 2025-03-21
Payer: COMMERCIAL

## 2025-03-26 ENCOUNTER — APPOINTMENT (OUTPATIENT)
Dept: CARDIOLOGY | Facility: CLINIC | Age: 35
End: 2025-03-26
Payer: COMMERCIAL

## 2025-04-08 ENCOUNTER — APPOINTMENT (OUTPATIENT)
Dept: PHARMACY | Facility: HOSPITAL | Age: 35
End: 2025-04-08
Payer: COMMERCIAL

## 2025-04-15 ENCOUNTER — APPOINTMENT (OUTPATIENT)
Dept: BEHAVIORAL HEALTH | Facility: CLINIC | Age: 35
End: 2025-04-15
Payer: COMMERCIAL

## 2025-06-18 ENCOUNTER — OFFICE VISIT (OUTPATIENT)
Dept: CARDIOLOGY | Facility: CLINIC | Age: 35
End: 2025-06-18
Payer: COMMERCIAL

## 2025-06-18 VITALS
BODY MASS INDEX: 26.66 KG/M2 | HEIGHT: 65 IN | TEMPERATURE: 98.2 F | SYSTOLIC BLOOD PRESSURE: 167 MMHG | WEIGHT: 160 LBS | HEART RATE: 90 BPM | DIASTOLIC BLOOD PRESSURE: 108 MMHG

## 2025-06-18 DIAGNOSIS — R07.9 CHEST PAIN, UNSPECIFIED TYPE: ICD-10-CM

## 2025-06-18 DIAGNOSIS — R00.0 TACHYCARDIA, UNSPECIFIED: ICD-10-CM

## 2025-06-18 DIAGNOSIS — I10 HYPERTENSION, UNSPECIFIED TYPE: Primary | ICD-10-CM

## 2025-06-18 DIAGNOSIS — R00.2 PALPITATIONS: ICD-10-CM

## 2025-06-18 PROCEDURE — 1036F TOBACCO NON-USER: CPT | Performed by: NURSE PRACTITIONER

## 2025-06-18 PROCEDURE — 3077F SYST BP >= 140 MM HG: CPT | Performed by: NURSE PRACTITIONER

## 2025-06-18 PROCEDURE — 99205 OFFICE O/P NEW HI 60 MIN: CPT | Performed by: NURSE PRACTITIONER

## 2025-06-18 PROCEDURE — 3008F BODY MASS INDEX DOCD: CPT | Performed by: NURSE PRACTITIONER

## 2025-06-18 PROCEDURE — 3080F DIAST BP >= 90 MM HG: CPT | Performed by: NURSE PRACTITIONER

## 2025-06-18 PROCEDURE — 99212 OFFICE O/P EST SF 10 MIN: CPT

## 2025-06-18 RX ORDER — PROPRANOLOL HYDROCHLORIDE 80 MG/1
80 CAPSULE, EXTENDED RELEASE ORAL EVERY EVENING
Qty: 30 CAPSULE | Refills: 0 | Status: SHIPPED | OUTPATIENT
Start: 2025-06-18 | End: 2026-06-18

## 2025-06-18 RX ORDER — PROPRANOLOL HYDROCHLORIDE 80 MG/1
80 CAPSULE, EXTENDED RELEASE ORAL EVERY EVENING
Qty: 30 CAPSULE | Refills: 0 | Status: SHIPPED | OUTPATIENT
Start: 2025-06-18 | End: 2025-06-18

## 2025-06-18 NOTE — PROGRESS NOTES
Chief Complaint:   Chest Pain     History Of Present Illness:    Navya Echeverria is a 35 y.o. female here with chest pain and elevated blood pressure.    Has not been feeling well since beginning of year.   When bp goes up she feels a tingling in right arm and soreness in chest.   Feel bp going up 2x a day.  When she checks bp at home it is usually 160/100.     C/O brain fog. Also notes racing sensation. There was a month where she did not have any palpitations but they have returned.     She has cut out caffeine/ETOH.    Presented to ED, 3/19/25, with chest pain. Occurring weekly. Describes as a tightness. Heart will start racing. BP in /109. Troponin -x2. EKG ST with no ischemic changes per my review. PE evaluation negative. TSH WNL. Instructed to follow up for bp. Discharged in stable condition.     When she presented to ED 2/12/25 she c/o numbness from forehead down to right arm. Was tachycardic and hypertensive. CT of head and neck were negative for acute findings. Troponin -2. Symptoms improved with atarax.     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    Past Surgical History:  She has a past surgical history that includes Mouth surgery; XR ankle (Left); Tonsillectomy; Adenoidectomy; Robotic assisted hysterectomy; and Salpingectomy (Bilateral).      Social History:  She reports that she has never smoked. She has never used smokeless tobacco. She reports current alcohol use of about 1.0 standard drink of alcohol per week. She reports that she does not use drugs.    Family History:  Family History[1]     Allergies:  Shellfish derived, Aspirin, Penicillin, and Sertraline    Review of Systems  All pertinent systems have been reviewed and  "are negative except for what is stated in the history of present illness.    All other systems have been reviewed and are negative and noncontributory to this patient's current ailments.     Visit Vitals  BP (!) 167/108 (BP Location: Right arm)   Pulse 90   Temp 36.8 °C (98.2 °F)   Ht 1.651 m (5' 5\")   Wt 72.6 kg (160 lb)   LMP  (LMP Unknown)   BMI 26.63 kg/m²   OB Status Hysterectomy   Smoking Status Never   BSA 1.82 m²     Last Labs:  CBC -  Lab Results   Component Value Date    WBC 6.9 03/19/2025    WBC 5.0 03/03/2025    HGB 10.2 (L) 03/19/2025    HGB 10.3 (L) 03/03/2025    HCT 31.8 (L) 03/19/2025    HCT 33.4 (L) 03/03/2025    MCV 76 (L) 03/19/2025    MCV 80.1 03/03/2025     03/19/2025     03/03/2025       CMP -  Lab Results   Component Value Date    CALCIUM 10.0 03/19/2025    PROT 8.0 03/19/2025    ALBUMIN 4.5 03/19/2025    AST 11 03/19/2025    ALT 8 03/19/2025    ALKPHOS 39 03/19/2025    BILITOT 0.4 03/19/2025    BUN 7 03/19/2025    CREATININE 0.70 03/19/2025       LIPID PANEL -   Lab Results   Component Value Date    CHOL 165 03/03/2025    TRIG 153 (H) 03/03/2025    HDL 37 (L) 03/03/2025    CHHDL 4.5 03/03/2025    NHDL 128 03/03/2025       RENAL FUNCTION PANEL -   Lab Results   Component Value Date    GLUCOSE 88 03/19/2025     03/19/2025    K 3.5 03/19/2025     (H) 03/19/2025    CO2 25 03/19/2025    ANIONGAP 11 03/19/2025    BUN 7 03/19/2025    CREATININE 0.70 03/19/2025    CALCIUM 10.0 03/19/2025    ALBUMIN 4.5 03/19/2025        No results found for: \"BNP\", \"HGBA1C\"    Objective   Vitals reviewed.   Constitutional:       Appearance: Healthy appearance. Not in distress.   Eyes:      Conjunctiva/sclera: Conjunctivae normal.   Neck:      Vascular: No JVR. JVD normal.   Pulmonary:      Effort: Pulmonary effort is normal.      Breath sounds: Normal breath sounds. No wheezing. No rhonchi. No rales.   Chest:      Chest wall: Not tender to palpatation.   Cardiovascular:      PMI at left " midclavicular line. Normal rate. Regular rhythm. Normal S1. Normal S2.       Murmurs: There is no murmur.      No gallop.  No click. No rub.   Edema:     Peripheral edema absent.   Abdominal:      General: Bowel sounds are normal.      Tenderness: There is no abdominal tenderness.   Musculoskeletal: Normal range of motion.         General: No tenderness. Skin:     General: Skin is warm and dry.   Neurological:      General: No focal deficit present.      Mental Status: Alert and oriented to person, place and time.   Psychiatric:         Attention and Perception: Attention normal.         Mood and Affect: Mood normal.       Assessment/Plan   Diagnoses and all orders for this visit:  Hypertension, unspecified type  - bp consistently elevated  - with c/o palpitations and tachycardia we will start propranolol LA (Inderal LA) 80 mg 24 hr capsule; Take 1 capsule (80 mg) by mouth once daily in the evening. Do not crush, chew, or split.  - she also notes anxiety   - checking Vascular US renal artery duplex complete; Future  Chest pain, unspecified type  - chest pain improves with decrease in bp  - troponins have been negative   - risk for cad is very low (no noted family hx, HLD, smoking, dm)  Tachycardia, unspecified  - ST per my interpretation of EKG from March   - propranolol   - TSH WNL  Palpitations  - ST as documented in ED  - if do not improve with propranolol we will perform a holter    Follow up in 3-4 weeks    Outpatient Medications:  Current Outpatient Medications   Medication Instructions    hydrOXYzine HCL (ATARAX) 25 mg, oral, Every 8 hours PRN    propranolol LA (INDERAL LA) 80 mg, oral, Every evening, Do not crush, chew, or split.     Exclusive of any other services or procedures performed, Isa BEE-SAAD, spent 30 minutes in duration for this visit today.  This time consisted of chart review, obtaining history, and/or performing the exam as documented above, as well as, documenting clinical  information for the encounter in the electronic record. In addition to the history, testing, notes, and labs I have noted above; I have reviewed ED note from March and February. Reviewed labs from 2/12 and 3/19.              [1] No family history on file.

## 2025-06-27 ENCOUNTER — HOSPITAL ENCOUNTER (OUTPATIENT)
Dept: VASCULAR MEDICINE | Facility: HOSPITAL | Age: 35
Discharge: HOME | End: 2025-06-27
Payer: COMMERCIAL

## 2025-06-27 DIAGNOSIS — I10 HYPERTENSION, UNSPECIFIED TYPE: ICD-10-CM

## 2025-06-27 PROCEDURE — 93975 VASCULAR STUDY: CPT

## 2025-07-03 ENCOUNTER — APPOINTMENT (OUTPATIENT)
Dept: CARDIOLOGY | Facility: CLINIC | Age: 35
End: 2025-07-03
Payer: COMMERCIAL

## 2025-07-10 ENCOUNTER — OFFICE VISIT (OUTPATIENT)
Dept: CARDIOLOGY | Facility: CLINIC | Age: 35
End: 2025-07-10
Payer: COMMERCIAL

## 2025-07-10 VITALS
TEMPERATURE: 98.3 F | HEART RATE: 66 BPM | HEIGHT: 65 IN | SYSTOLIC BLOOD PRESSURE: 141 MMHG | DIASTOLIC BLOOD PRESSURE: 91 MMHG | WEIGHT: 162.3 LBS | BODY MASS INDEX: 27.04 KG/M2

## 2025-07-10 DIAGNOSIS — R00.2 PALPITATIONS: ICD-10-CM

## 2025-07-10 DIAGNOSIS — R07.9 CHEST PAIN, UNSPECIFIED TYPE: ICD-10-CM

## 2025-07-10 DIAGNOSIS — I10 HYPERTENSION, UNSPECIFIED TYPE: Primary | ICD-10-CM

## 2025-07-10 DIAGNOSIS — R00.0 TACHYCARDIA, UNSPECIFIED: ICD-10-CM

## 2025-07-10 PROCEDURE — 3008F BODY MASS INDEX DOCD: CPT | Performed by: NURSE PRACTITIONER

## 2025-07-10 PROCEDURE — 99212 OFFICE O/P EST SF 10 MIN: CPT

## 2025-07-10 PROCEDURE — 99214 OFFICE O/P EST MOD 30 MIN: CPT | Performed by: NURSE PRACTITIONER

## 2025-07-10 PROCEDURE — 3080F DIAST BP >= 90 MM HG: CPT | Performed by: NURSE PRACTITIONER

## 2025-07-10 PROCEDURE — 1036F TOBACCO NON-USER: CPT | Performed by: NURSE PRACTITIONER

## 2025-07-10 PROCEDURE — 3077F SYST BP >= 140 MM HG: CPT | Performed by: NURSE PRACTITIONER

## 2025-07-10 RX ORDER — PROPRANOLOL HYDROCHLORIDE 60 MG/1
60 CAPSULE, EXTENDED RELEASE ORAL 2 TIMES DAILY
Qty: 180 CAPSULE | Refills: 3 | Status: SHIPPED | OUTPATIENT
Start: 2025-07-10 | End: 2026-07-10

## 2025-07-10 NOTE — PROGRESS NOTES
Chief Complaint:   Chest Pain     History Of Present Illness:    Navya Echeverria is a 35 y.o. female here with chest pain, palpitations and elevated blood pressure. Noted to be tachycardic.   Started on propranolol at last office visit, she is here for follow up.   Renal artery US was normal.     Doing well on propranolol. Started to take it in am because she felt like it was wearing off part way through the day. Does report some fatigue with taking it.    Has not been feeling well since beginning of year.   When bp goes up she feels a tingling in right arm and soreness in chest.   Feel bp going up 2x a day.  When she checks bp at home it is usually 160/100.     C/O brain fog. Also notes racing sensation. There was a month where she did not have any palpitations but they have returned.     She has cut out caffeine/ETOH.    Presented to ED, 3/19/25, with chest pain. Occurring weekly. Describes as a tightness. Heart will start racing. BP in /109. Troponin -x2. EKG ST with no ischemic changes per my review. PE evaluation negative. TSH WNL. Instructed to follow up for bp. Discharged in stable condition.     When she presented to ED 2/12/25 she c/o numbness from forehead down to right arm. Was tachycardic and hypertensive. CT of head and neck were negative for acute findings. Troponin -2. Symptoms improved with atarax.     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    Past Surgical History:  She has a past surgical history that includes Mouth surgery; XR ankle (Left); Tonsillectomy; Adenoidectomy; Robotic assisted hysterectomy; and Salpingectomy (Bilateral).      Social History:  She reports that she has never smoked. She has never used smokeless  "tobacco. She reports current alcohol use of about 1.0 standard drink of alcohol per week. She reports that she does not use drugs.    Family History:  Family History[1]     Allergies:  Shellfish derived, Aspirin, Penicillin, and Sertraline    Review of Systems  All pertinent systems have been reviewed and are negative except for what is stated in the history of present illness.    All other systems have been reviewed and are negative and noncontributory to this patient's current ailments.     Visit Vitals  BP (!) 141/91 (BP Location: Right arm, Patient Position: Sitting, BP Cuff Size: Adult)   Pulse 66   Temp 36.8 °C (98.3 °F)   Ht 1.651 m (5' 5\")   Wt 73.6 kg (162 lb 4.8 oz)   LMP  (LMP Unknown)   BMI 27.01 kg/m²   OB Status Hysterectomy   Smoking Status Never   BSA 1.84 m²     Last Labs:  CBC -  Lab Results   Component Value Date    WBC 6.9 03/19/2025    WBC 5.0 03/03/2025    HGB 10.2 (L) 03/19/2025    HGB 10.3 (L) 03/03/2025    HCT 31.8 (L) 03/19/2025    HCT 33.4 (L) 03/03/2025    MCV 76 (L) 03/19/2025    MCV 80.1 03/03/2025     03/19/2025     03/03/2025       CMP -  Lab Results   Component Value Date    CALCIUM 10.0 03/19/2025    PROT 8.0 03/19/2025    ALBUMIN 4.5 03/19/2025    AST 11 03/19/2025    ALT 8 03/19/2025    ALKPHOS 39 03/19/2025    BILITOT 0.4 03/19/2025    BUN 7 03/19/2025    CREATININE 0.70 03/19/2025       LIPID PANEL -   Lab Results   Component Value Date    CHOL 165 03/03/2025    TRIG 153 (H) 03/03/2025    HDL 37 (L) 03/03/2025    CHHDL 4.5 03/03/2025    NHDL 128 03/03/2025       RENAL FUNCTION PANEL -   Lab Results   Component Value Date    GLUCOSE 88 03/19/2025     03/19/2025    K 3.5 03/19/2025     (H) 03/19/2025    CO2 25 03/19/2025    ANIONGAP 11 03/19/2025    BUN 7 03/19/2025    CREATININE 0.70 03/19/2025    CALCIUM 10.0 03/19/2025    ALBUMIN 4.5 03/19/2025        No results found for: \"BNP\", \"HGBA1C\"    Objective   Vitals reviewed.   Constitutional:       " Appearance: Healthy appearance. Not in distress.   Eyes:      Conjunctiva/sclera: Conjunctivae normal.   Neck:      Vascular: No JVR. JVD normal.   Pulmonary:      Effort: Pulmonary effort is normal.      Breath sounds: Normal breath sounds. No wheezing. No rhonchi. No rales.   Chest:      Chest wall: Not tender to palpatation.   Cardiovascular:      PMI at left midclavicular line. Normal rate. Regular rhythm. Normal S1. Normal S2.       Murmurs: There is no murmur.      No gallop.  No click. No rub.   Edema:     Peripheral edema absent.   Abdominal:      General: Bowel sounds are normal.      Tenderness: There is no abdominal tenderness.   Musculoskeletal: Normal range of motion.         General: No tenderness. Skin:     General: Skin is warm and dry.   Neurological:      General: No focal deficit present.      Mental Status: Alert and oriented to person, place and time.   Psychiatric:         Attention and Perception: Attention normal.         Mood and Affect: Mood normal.       Assessment/Plan   Diagnoses and all orders for this visit:  Hypertension, unspecified type  - Home readings with propranolol 137/100;128/90  - will increase to 60mg BID, since 80mg in am is causing some fatigue and we need more bp reduction  - nothing untoward on renal US  Chest pain, unspecified type  - improved  - resolves with rubbing check   - troponins have been negative   - risk for cad is very low (no noted family hx, HLD, smoking, dm)  Tachycardia, unspecified  - much improved  - continue propranolol   - TSH WNL  Palpitations  - ST as documented in ED  - improves significantly with propranolol    Follow up 1 year     Outpatient Medications:  Current Outpatient Medications   Medication Instructions    hydrOXYzine HCL (ATARAX) 25 mg, oral, Every 8 hours PRN    propranolol LA (INDERAL LA) 60 mg, oral, 2 times daily, Do not crush, chew, or split.     Exclusive of any other services or procedures performed, IIsa APRN-CNP,  spent 20 minutes in duration for this visit today.  This time consisted of chart review, obtaining history, and/or performing the exam as documented above, as well as, documenting clinical information for the encounter in the electronic record. In addition to the history, testing, notes, and labs I have noted above; I have reviewed ED note from March and February. Reviewed labs from 2/12 and 3/19.                [1] No family history on file.

## 2025-07-13 ENCOUNTER — APPOINTMENT (OUTPATIENT)
Dept: RADIOLOGY | Facility: HOSPITAL | Age: 35
End: 2025-07-13
Payer: COMMERCIAL

## 2025-07-18 DIAGNOSIS — E55.9 VITAMIN D DEFICIENCY: ICD-10-CM

## 2025-07-18 RX ORDER — ERGOCALCIFEROL 1.25 MG/1
50000 CAPSULE ORAL
Qty: 12 CAPSULE | Refills: 0 | OUTPATIENT
Start: 2025-07-20 | End: 2025-10-12

## 2025-07-18 RX ORDER — ERGOCALCIFEROL 1.25 MG/1
CAPSULE ORAL
COMMUNITY
Start: 2025-04-28

## 2025-07-19 ENCOUNTER — APPOINTMENT (OUTPATIENT)
Dept: RADIOLOGY | Facility: HOSPITAL | Age: 35
End: 2025-07-19
Payer: COMMERCIAL

## 2025-08-20 ENCOUNTER — APPOINTMENT (OUTPATIENT)
Dept: PRIMARY CARE | Facility: CLINIC | Age: 35
End: 2025-08-20
Payer: COMMERCIAL

## 2025-08-21 DIAGNOSIS — I10 HYPERTENSION, UNSPECIFIED TYPE: ICD-10-CM

## 2025-08-21 DIAGNOSIS — R00.0 TACHYCARDIA, UNSPECIFIED: Primary | ICD-10-CM

## 2025-08-21 RX ORDER — PROPRANOLOL HYDROCHLORIDE 80 MG/1
80 CAPSULE, EXTENDED RELEASE ORAL 2 TIMES DAILY
Qty: 180 CAPSULE | Refills: 3 | Status: SHIPPED | OUTPATIENT
Start: 2025-08-21 | End: 2026-08-21

## 2025-08-21 RX ORDER — AMLODIPINE BESYLATE 5 MG/1
5 TABLET ORAL DAILY
Qty: 30 TABLET | Refills: 0 | Status: SHIPPED | OUTPATIENT
Start: 2025-08-21 | End: 2026-08-21

## 2025-08-27 ENCOUNTER — APPOINTMENT (OUTPATIENT)
Dept: PRIMARY CARE | Facility: CLINIC | Age: 35
End: 2025-08-27
Payer: COMMERCIAL

## 2025-08-27 VITALS
HEART RATE: 64 BPM | RESPIRATION RATE: 12 BRPM | BODY MASS INDEX: 26.16 KG/M2 | TEMPERATURE: 97 F | OXYGEN SATURATION: 98 % | SYSTOLIC BLOOD PRESSURE: 110 MMHG | HEIGHT: 65 IN | DIASTOLIC BLOOD PRESSURE: 70 MMHG | WEIGHT: 157 LBS

## 2025-08-27 DIAGNOSIS — I10 HYPERTENSION, UNSPECIFIED TYPE: ICD-10-CM

## 2025-08-27 DIAGNOSIS — D64.9 ANEMIA, UNSPECIFIED TYPE: ICD-10-CM

## 2025-08-27 DIAGNOSIS — Z13.29 SCREENING FOR ENDOCRINE DISORDER: ICD-10-CM

## 2025-08-27 DIAGNOSIS — E55.9 VITAMIN D DEFICIENCY: ICD-10-CM

## 2025-08-27 DIAGNOSIS — Z00.00 WELLNESS EXAMINATION: Primary | ICD-10-CM

## 2025-08-27 DIAGNOSIS — Z13.6 SCREENING FOR HEART DISEASE: ICD-10-CM

## 2025-08-27 DIAGNOSIS — Z13.0 SCREENING FOR DISORDER OF BLOOD AND BLOOD-FORMING ORGANS: ICD-10-CM

## 2025-08-27 DIAGNOSIS — R40.0 DAYTIME SLEEPINESS: ICD-10-CM

## 2025-08-27 PROCEDURE — 1036F TOBACCO NON-USER: CPT | Performed by: FAMILY MEDICINE

## 2025-08-27 PROCEDURE — 3008F BODY MASS INDEX DOCD: CPT | Performed by: FAMILY MEDICINE

## 2025-08-27 PROCEDURE — 3078F DIAST BP <80 MM HG: CPT | Performed by: FAMILY MEDICINE

## 2025-08-27 PROCEDURE — 3074F SYST BP LT 130 MM HG: CPT | Performed by: FAMILY MEDICINE

## 2025-08-27 PROCEDURE — 99395 PREV VISIT EST AGE 18-39: CPT | Performed by: FAMILY MEDICINE

## 2025-08-28 LAB
25(OH)D3+25(OH)D2 SERPL-MCNC: 37 NG/ML (ref 30–100)
BASOPHILS # BLD AUTO: 40 CELLS/UL (ref 0–200)
BASOPHILS NFR BLD AUTO: 0.7 %
EOSINOPHIL # BLD AUTO: 108 CELLS/UL (ref 15–500)
EOSINOPHIL NFR BLD AUTO: 1.9 %
ERYTHROCYTE [DISTWIDTH] IN BLOOD BY AUTOMATED COUNT: 12.2 % (ref 11–15)
FERRITIN SERPL-MCNC: 32 NG/ML (ref 16–154)
HCT VFR BLD AUTO: 35.3 % (ref 35–45)
HGB BLD-MCNC: 10.7 G/DL (ref 11.7–15.5)
IRON SATN MFR SERPL: 40 % (CALC) (ref 16–45)
IRON SERPL-MCNC: 134 MCG/DL (ref 40–190)
LYMPHOCYTES # BLD AUTO: 1607 CELLS/UL (ref 850–3900)
LYMPHOCYTES NFR BLD AUTO: 28.2 %
MCH RBC QN AUTO: 24.4 PG (ref 27–33)
MCHC RBC AUTO-ENTMCNC: 30.3 G/DL (ref 32–36)
MCV RBC AUTO: 80.4 FL (ref 80–100)
MONOCYTES # BLD AUTO: 371 CELLS/UL (ref 200–950)
MONOCYTES NFR BLD AUTO: 6.5 %
NEUTROPHILS # BLD AUTO: 3574 CELLS/UL (ref 1500–7800)
NEUTROPHILS NFR BLD AUTO: 62.7 %
PLATELET # BLD AUTO: 283 THOUSAND/UL (ref 140–400)
PMV BLD REES-ECKER: 13 FL (ref 7.5–12.5)
RBC # BLD AUTO: 4.39 MILLION/UL (ref 3.8–5.1)
TIBC SERPL-MCNC: 337 MCG/DL (CALC) (ref 250–450)
WBC # BLD AUTO: 5.7 THOUSAND/UL (ref 3.8–10.8)

## 2025-09-03 ENCOUNTER — APPOINTMENT (OUTPATIENT)
Dept: NEUROLOGY | Facility: CLINIC | Age: 35
End: 2025-09-03
Payer: COMMERCIAL

## (undated) DEVICE — SUTURE, V-LOC, 2-0, 180 GR9 GS-21

## (undated) DEVICE — Device

## (undated) DEVICE — SEALER, VESSEL, EXTENDED

## (undated) DEVICE — DRAPE, COLUMN, DAVINCI XI

## (undated) DEVICE — POSITIONING KIT, PAGAZZI, PINK PAD XL, W/ ARM AND HEAD REST

## (undated) DEVICE — PUMP, STRYKERFLOW 2 & HANDPIECE W/10FT. IRRIGATION TUBING

## (undated) DEVICE — TUBE SET, PNEUMOLAR HEATED, SMOKE EVACU, HIGH-FLOW

## (undated) DEVICE — NEEDLE, INSUFFLATION, 13GAX120MM, DISP

## (undated) DEVICE — DRAPE, ARM XI

## (undated) DEVICE — SYRINGE, 60 CC, IRRIGATION, BULB, CONTRO-BULB, PAPER POUCH

## (undated) DEVICE — SEAL, UNIVERSAL 5-8MM  XI

## (undated) DEVICE — SHEARS, CURVED 5MM, ENDOPATH

## (undated) DEVICE — DRAPE, UNDERBUTTOCKS, W / 27IN FLUID POUCH

## (undated) DEVICE — LUBRICANT, ELECTROLUBE, F/ELECTRODE TIPS

## (undated) DEVICE — HEMOSTAT, SURGICEL POWDER 3.0GRAMS

## (undated) DEVICE — SUTURE, MONOCRYL, 4-0, 18 IN, PS2, UNDYED

## (undated) DEVICE — SYRINGE, 10 CC, LUER LOCK

## (undated) DEVICE — CARE KIT, LAPAROSCOPIC, ADVANCED

## (undated) DEVICE — TRAY, FOLEY, LUBRI-SIL, 16FR, COMPLETE CARE W/STATLOCK

## (undated) DEVICE — OBTURATOR, BLADELESS , SU

## (undated) DEVICE — SYSTEM, TROCAR LAP, 5X100MM, SHIELD BLADED, KII ADVANCED FIX ABDOMINAL